# Patient Record
Sex: FEMALE | Race: WHITE | NOT HISPANIC OR LATINO | Employment: OTHER | URBAN - METROPOLITAN AREA
[De-identification: names, ages, dates, MRNs, and addresses within clinical notes are randomized per-mention and may not be internally consistent; named-entity substitution may affect disease eponyms.]

---

## 2018-08-25 ENCOUNTER — HOSPITAL ENCOUNTER (EMERGENCY)
Facility: HOSPITAL | Age: 53
Discharge: HOME | End: 2018-08-25
Attending: EMERGENCY MEDICINE
Payer: MEDICARE

## 2018-08-25 VITALS
OXYGEN SATURATION: 99 % | WEIGHT: 120 LBS | DIASTOLIC BLOOD PRESSURE: 65 MMHG | BODY MASS INDEX: 23.44 KG/M2 | SYSTOLIC BLOOD PRESSURE: 140 MMHG | RESPIRATION RATE: 20 BRPM | TEMPERATURE: 98.6 F | HEART RATE: 86 BPM

## 2018-08-25 DIAGNOSIS — F14.10 COCAINE ABUSE (HCC): Primary | ICD-10-CM

## 2018-08-25 LAB
ALBUMIN SERPL BCP-MCNC: 3.4 G/DL (ref 3.5–5)
ALP SERPL-CCNC: 52 U/L (ref 46–116)
ALT SERPL W P-5'-P-CCNC: 29 U/L (ref 12–78)
AMPHETAMINES SERPL QL SCN: NEGATIVE
ANION GAP SERPL CALCULATED.3IONS-SCNC: 5 MMOL/L (ref 4–13)
AST SERPL W P-5'-P-CCNC: 19 U/L (ref 5–45)
BARBITURATES UR QL: NEGATIVE
BASOPHILS # BLD AUTO: 0.02 THOUSANDS/ΜL (ref 0–0.1)
BASOPHILS NFR BLD AUTO: 0 % (ref 0–1)
BENZODIAZ UR QL: NEGATIVE
BILIRUB SERPL-MCNC: 0.3 MG/DL (ref 0.2–1)
BUN SERPL-MCNC: 5 MG/DL (ref 5–25)
CALCIUM SERPL-MCNC: 8.9 MG/DL (ref 8.3–10.1)
CHLORIDE SERPL-SCNC: 108 MMOL/L (ref 100–108)
CO2 SERPL-SCNC: 31 MMOL/L (ref 21–32)
COCAINE UR QL: POSITIVE
CREAT SERPL-MCNC: 0.78 MG/DL (ref 0.6–1.3)
EOSINOPHIL # BLD AUTO: 0.06 THOUSAND/ΜL (ref 0–0.61)
EOSINOPHIL NFR BLD AUTO: 1 % (ref 0–6)
ERYTHROCYTE [DISTWIDTH] IN BLOOD BY AUTOMATED COUNT: 12.5 % (ref 11.6–15.1)
GFR SERPL CREATININE-BSD FRML MDRD: 88 ML/MIN/1.73SQ M
GLUCOSE SERPL-MCNC: 111 MG/DL (ref 65–140)
HCT VFR BLD AUTO: 41 % (ref 34.8–46.1)
HGB BLD-MCNC: 13.5 G/DL (ref 11.5–15.4)
IMM GRANULOCYTES # BLD AUTO: 0.02 THOUSAND/UL (ref 0–0.2)
IMM GRANULOCYTES NFR BLD AUTO: 0 % (ref 0–2)
LYMPHOCYTES # BLD AUTO: 1.73 THOUSANDS/ΜL (ref 0.6–4.47)
LYMPHOCYTES NFR BLD AUTO: 19 % (ref 14–44)
MCH RBC QN AUTO: 31.9 PG (ref 26.8–34.3)
MCHC RBC AUTO-ENTMCNC: 32.9 G/DL (ref 31.4–37.4)
MCV RBC AUTO: 97 FL (ref 82–98)
METHADONE UR QL: NEGATIVE
MONOCYTES # BLD AUTO: 0.65 THOUSAND/ΜL (ref 0.17–1.22)
MONOCYTES NFR BLD AUTO: 7 % (ref 4–12)
NEUTROPHILS # BLD AUTO: 6.46 THOUSANDS/ΜL (ref 1.85–7.62)
NEUTS SEG NFR BLD AUTO: 73 % (ref 43–75)
NRBC BLD AUTO-RTO: 0 /100 WBCS
OPIATES UR QL SCN: NEGATIVE
PCP UR QL: NEGATIVE
PLATELET # BLD AUTO: 332 THOUSANDS/UL (ref 149–390)
PMV BLD AUTO: 10.3 FL (ref 8.9–12.7)
POTASSIUM SERPL-SCNC: 3.6 MMOL/L (ref 3.5–5.3)
PROT SERPL-MCNC: 7.1 G/DL (ref 6.4–8.2)
RBC # BLD AUTO: 4.23 MILLION/UL (ref 3.81–5.12)
SODIUM SERPL-SCNC: 144 MMOL/L (ref 136–145)
THC UR QL: NEGATIVE
WBC # BLD AUTO: 8.94 THOUSAND/UL (ref 4.31–10.16)

## 2018-08-25 PROCEDURE — 96375 TX/PRO/DX INJ NEW DRUG ADDON: CPT

## 2018-08-25 PROCEDURE — 85025 COMPLETE CBC W/AUTO DIFF WBC: CPT | Performed by: EMERGENCY MEDICINE

## 2018-08-25 PROCEDURE — 99284 EMERGENCY DEPT VISIT MOD MDM: CPT

## 2018-08-25 PROCEDURE — 80053 COMPREHEN METABOLIC PANEL: CPT | Performed by: EMERGENCY MEDICINE

## 2018-08-25 PROCEDURE — 36415 COLL VENOUS BLD VENIPUNCTURE: CPT | Performed by: EMERGENCY MEDICINE

## 2018-08-25 PROCEDURE — 96374 THER/PROPH/DIAG INJ IV PUSH: CPT

## 2018-08-25 PROCEDURE — 80307 DRUG TEST PRSMV CHEM ANLYZR: CPT | Performed by: EMERGENCY MEDICINE

## 2018-08-25 PROCEDURE — 96361 HYDRATE IV INFUSION ADD-ON: CPT

## 2018-08-25 RX ORDER — LORAZEPAM 2 MG/ML
1 INJECTION INTRAMUSCULAR ONCE
Status: COMPLETED | OUTPATIENT
Start: 2018-08-25 | End: 2018-08-25

## 2018-08-25 RX ORDER — DIPHENHYDRAMINE HYDROCHLORIDE 50 MG/ML
25 INJECTION INTRAMUSCULAR; INTRAVENOUS ONCE
Status: COMPLETED | OUTPATIENT
Start: 2018-08-25 | End: 2018-08-25

## 2018-08-25 RX ORDER — OXYCODONE HYDROCHLORIDE AND ACETAMINOPHEN 5; 325 MG/1; MG/1
1 TABLET ORAL 3 TIMES DAILY
Status: ON HOLD | COMMUNITY
End: 2022-01-01 | Stop reason: CLARIF

## 2018-08-25 RX ADMIN — DIPHENHYDRAMINE HYDROCHLORIDE 25 MG: 50 INJECTION, SOLUTION INTRAMUSCULAR; INTRAVENOUS at 08:02

## 2018-08-25 RX ADMIN — SODIUM CHLORIDE 1000 ML: 0.9 INJECTION, SOLUTION INTRAVENOUS at 08:05

## 2018-08-25 RX ADMIN — LORAZEPAM 1 MG: 2 INJECTION INTRAMUSCULAR; INTRAVENOUS at 08:41

## 2018-08-25 NOTE — DISCHARGE INSTRUCTIONS
Cocaine Abuse   WHAT YOU NEED TO KNOW:   Cocaine abuse is a pattern of use that causes health or other problems  Abuse can include using large amounts of cocaine at one time or using it several times each day or week  You may start needing more cocaine to get the same feelings of happiness you got from lower amounts  You may have withdrawal symptoms if you have used cocaine for a long time and you suddenly use less or stop using it  DISCHARGE INSTRUCTIONS:   Seek care immediately if:   · You feel like hurting yourself or someone else  · You have a seizure  · You have a temperature over 101°F (38 3°C) after you use cocaine  · You cough or spit up blood  · You have severe abdominal pain  · You have a severe headache, confusion, or feel very nervous  · You have weakness on one side of your body  · You have chest pain, sweating, or shortness of breath  Contact your healthcare provider if:   · You feel you cannot cope with your problems  · You have questions or concerns about your condition or care  Signs of cocaine abuse:  Cocaine abuse may lead to problems being around others, doing your job, or new medical problems  You may have the following problems:  · Use of more cocaine than you first wanted to use     · No ability to decrease or control your use of cocaine    · Spending much of your time using cocaine, or dealing with a hangover after you use cocaine    · Less time spent around others, at work, or doing activities that you enjoy    · Continued cocaine use, even when it causes physical or mental problems  How cocaine may affect your baby:   · Cocaine may harm your unborn baby's brain, heart, stomach, and bowels  It also increases your risk of a miscarriage, early delivery, or stillbirth  Cocaine can cause long-term medical problems for your baby  · Your baby may go through withdrawal after he is born  He may have seizures, problems waking, or feeding   He may overreact to sounds or movement by violently jerking or jumping  He may vomit or have diarrhea  He may have learning difficulties or other behavior problems when he gets older  Signs and symptoms of cocaine withdrawal:  Withdrawal happens when you have used cocaine for a long period of time, and you suddenly take less or stop taking it  Symptoms may begin within a few hours after you decrease or stop taking cocaine and may include the following:  · Severe sadness or fatigue    · Restlessness, nervousness, or anxiety    · Nausea or vomiting    · Trouble sleeping or difficulty waking up    · Unpleasant dreams that seem real    · Seeing, hearing, or feeling things that are not really there     · Sweating, shaking, or a fast heartbeat    · Seizure  Follow up with your healthcare provider as directed:  Write down your questions so you remember to ask them during your visits  For support and more information:   · Substance Abuse and [address] , [address] [place]  Web Address: https://[employer]/  · [facility] on Drug Abuse  [address] [place] [id number]  Phone: 9- 929 - 885-4196  Web Address: www lyle nih gov  © 2017 [address] [place] [address] Street is for End User's use only and may not be sold, redistributed or otherwise used for commercial purposes  All illustrations and images included in CareNotes® are the copyrighted property of A D A M , Inc  or [employer]  The above information is an  only  It is not intended as medical advice for individual conditions or treatments  Talk to your doctor, nurse or pharmacist before following any medical regimen to see if it is safe and effective for you

## 2018-08-25 NOTE — ED NOTES
Pt pulled out IV and attempted to enter another patients room  Disoriented and unsteady  Pt returned to room  Bleeding from IV site stopped and pt washed up  Changed into hospital [facility]  Bedding changed and pt returned to bed         Timothy Park RN  08/25/18 5514

## 2018-08-25 NOTE — ED PROVIDER NOTES
History  Chief Complaint   Patient presents with    Medication Problem     Pt reports taking percocet and 3 unisom last night to help sleep  Pt woke up this morning twitching, spastic movements  Patient is a 62-year-old female brought in by squad for reported full body twitching and unable to sit still  Patient states she took her normal Percocet last night associated with the borrow trazodone and 3 Unisom  Patient denies any other illegal substances  Patient denies any headache, no visual changes, no chest pain, no abdominal pain, no nausea vomiting or diarrhea  Patient states she has never had any issues like this in the past, patient has never taken use some or trazodone in the past             Prior to Admission Medications   Prescriptions Last Dose Informant Patient Reported? Taking? Doxylamine Succinate, Sleep, (UNISOM PO)   Yes Yes   Sig: Take by mouth   Multiple Vitamin (MULTIVITAMIN) capsule   Yes No   Sig: Take 1 capsule by mouth daily  oxyCODONE-acetaminophen (PERCOCET) 5-325 mg per tablet   Yes Yes   Sig: Take 1 tablet by mouth 3 (three) times a day      Facility-Administered Medications: None       Past Medical History:   Diagnosis Date    Chronic pain     neck       Past Surgical History:   Procedure Laterality Date    CERVICAL FUSION      NECK SURGERY         No family history on file  I have reviewed and agree with the history as documented  Social History   Substance Use Topics    Smoking status: Current Every Day Smoker     Packs/day: 0 20    Smokeless tobacco: Not on file    Alcohol use No        Review of Systems   Constitutional: Negative for chills and fever  HENT: Negative for facial swelling and trouble swallowing  Respiratory: Negative for chest tightness and shortness of breath  Cardiovascular: Negative for chest pain  Gastrointestinal: Negative for abdominal pain, nausea and vomiting  Genitourinary: Negative for dysuria and flank pain  Musculoskeletal: Positive for neck pain  Negative for back pain  Skin: Negative  Neurological: Positive for tremors  Negative for weakness, numbness and headaches  Hematological: Negative  Psychiatric/Behavioral: Negative  Physical Exam  Physical Exam   Constitutional: She is oriented to person, place, and time  She appears well-developed and well-nourished  She appears distressed  HENT:   Head: Normocephalic and atraumatic  Eyes: EOM are normal  Pupils are equal, round, and reactive to light  Neck: Normal range of motion  Cardiovascular: Normal rate and regular rhythm  Pulmonary/Chest: Effort normal and breath sounds normal    Abdominal: Soft  Bowel sounds are normal    Musculoskeletal: Normal range of motion  Neurological: She is alert and oriented to person, place, and time  No cranial nerve deficit  Patient is basically fluttering around on the bed in no discernible pattern of seizures or tremors   Skin: Skin is warm and dry  Psychiatric: Thought content normal  Her mood appears anxious  Her speech is rapid and/or pressured  She is agitated  Cognition and memory are normal    Nursing note and vitals reviewed        Vital Signs  ED Triage Vitals   Temperature Pulse Respirations Blood Pressure SpO2   08/25/18 0820 08/25/18 0750 08/25/18 0750 08/25/18 0750 08/25/18 0750   98 1 °F (36 7 °C) 94 22 (!) 203/155 98 %      Temp Source Heart Rate Source Patient Position - Orthostatic VS BP Location FiO2 (%)   08/25/18 0820 08/25/18 0750 08/25/18 0750 08/25/18 0750 --   Temporal Monitor Lying Right arm       Pain Score       08/25/18 0750       Worst Possible Pain           Vitals:    08/25/18 0750 08/25/18 1306   BP: (!) 203/155 140/65   Pulse: 94 86   Patient Position - Orthostatic VS: Lying Lying       Visual Acuity      ED Medications  Medications   diphenhydrAMINE (BENADRYL) injection 25 mg (25 mg Intravenous Given 8/25/18 0802)   sodium chloride 0 9 % bolus 1,000 mL (0 mL Intravenous Stopped 8/25/18 1005)   LORazepam (ATIVAN) 2 mg/mL injection 1 mg (1 mg Intravenous Given 8/25/18 0841)       Diagnostic Studies  Results Reviewed     Procedure Component Value Units Date/Time    Comprehensive metabolic panel [[id number]]  (Abnormal) Collected:  08/25/18 1301    Lab Status:  Final result Specimen:  Blood from Arm, Right Updated:  08/25/18 1324     Sodium 144 mmol/L      Potassium 3 6 mmol/L      Chloride 108 mmol/L      CO2 31 mmol/L      Anion Gap 5 mmol/L      BUN 5 mg/dL      Creatinine 0 78 mg/dL      Glucose 111 mg/dL      Calcium 8 9 mg/dL      AST 19 U/L      ALT 29 U/L      Alkaline Phosphatase 52 U/L      Total Protein 7 1 g/dL      Albumin 3 4 (L) g/dL      Total Bilirubin 0 30 mg/dL      eGFR 88 ml/min/1 73sq m     Narrative:         National Kidney Disease Education Program recommendations are as follows:  GFR calculation is accurate only with a steady state creatinine  Chronic Kidney disease less than 60 ml/min/1 73 sq  meters  Kidney failure less than 15 ml/min/1 73 sq  meters      CBC and differential [[id number]] Collected:  08/25/18 1301    Lab Status:  Final result Specimen:  Blood from Arm, Right Updated:  08/25/18 1306     WBC 8 94 Thousand/uL      RBC 4 23 Million/uL      Hemoglobin 13 5 g/dL      Hematocrit 41 0 %      MCV 97 fL      MCH 31 9 pg      MCHC 32 9 g/dL      RDW 12 5 %      MPV 10 3 fL      Platelets 307 Thousands/uL      nRBC 0 /100 WBCs      Neutrophils Relative 73 %      Immat GRANS % 0 %      Lymphocytes Relative 19 %      Monocytes Relative 7 %      Eosinophils Relative 1 %      Basophils Relative 0 %      Neutrophils Absolute 6 46 Thousands/µL      Immature Grans Absolute 0 02 Thousand/uL      Lymphocytes Absolute 1 73 Thousands/µL      Monocytes Absolute 0 65 Thousand/µL      Eosinophils Absolute 0 06 Thousand/µL      Basophils Absolute 0 02 Thousands/µL     Rapid drug screen, urine [[id number]]  (Abnormal) Collected:  08/25/18 0819    Lab Status:  Final result Specimen:  Urine from Urine, Clean Catch Updated:  08/25/18 0840     Amph/Meth UR Negative     Barbiturate Ur Negative     Benzodiazepine Urine Negative     Cocaine Urine Positive (A)     Methadone Urine Negative     Opiate Urine Negative     PCP Ur Negative     THC Urine Negative    Narrative:         Presumptive report  If requested, specimen will be sent to reference lab for confirmation  FOR MEDICAL PURPOSES ONLY  IF CONFIRMATION NEEDED PLEASE CONTACT THE LAB WITHIN 5 DAYS  Drug Screen Cutoff Levels:  AMPHETAMINE/METHAMPHETAMINES  1000 ng/mL  BARBITURATES     200 ng/mL  BENZODIAZEPINES     200 ng/mL  COCAINE      300 ng/mL  METHADONE      300 ng/mL  OPIATES      300 ng/mL  PHENCYCLIDINE     25 ng/mL  THC       50 ng/mL                 No orders to display              Procedures  Procedures       Phone Contacts  ED Phone Contact    ED Course                               MDM  Number of Diagnoses or Management Options  Cocaine abuse:   Diagnosis management comments: At the urine drug screen came back patient admits to using cocaine  Patient's left for many hours after given several doses of Ativan  Patient is stable for discharge home       Amount and/or Complexity of Data Reviewed  Clinical lab tests: ordered and reviewed      CritCare Time    Disposition  Final diagnoses:   Cocaine abuse     Time reflects when diagnosis was documented in both MDM as applicable and the Disposition within this note     Time User Action Codes Description Comment    8/25/2018  4:27 PM Rajendra Angeles Add [F14 10] Cocaine abuse       ED Disposition     ED Disposition Condition Comment    Discharge  Destini Miss discharge to home/self care      Condition at discharge: Stable        Follow-up Information     Follow up With Specialties Details Why Vita Noel MD  Schedule an appointment as soon as possible for a visit in 3 days  [address] [facility] [address] [facility]  [phone]            Discharge Medication List as of 8/25/2018  4:27 PM      CONTINUE these medications which have NOT CHANGED    Details   Doxylamine Succinate, Sleep, (UNISOM PO) Take by mouth, Historical Med      oxyCODONE-acetaminophen (PERCOCET) 5-325 mg per tablet Take 1 tablet by mouth 3 (three) times a day, Historical Med      Multiple Vitamin (MULTIVITAMIN) capsule Take 1 capsule by mouth daily  , Until Discontinued, Historical Med           No discharge procedures on file      ED Provider  Electronically Signed by           Jamilah Still MD  08/26/18 5283

## 2019-03-08 ENCOUNTER — HOSPITAL ENCOUNTER (EMERGENCY)
Facility: HOSPITAL | Age: 54
Discharge: HOME | End: 2019-03-08
Attending: EMERGENCY MEDICINE | Admitting: EMERGENCY MEDICINE
Payer: MEDICARE

## 2019-03-08 ENCOUNTER — APPOINTMENT (EMERGENCY)
Dept: RADIOLOGY | Facility: HOSPITAL | Age: 54
End: 2019-03-08
Payer: MEDICARE

## 2019-03-08 VITALS
HEART RATE: 101 BPM | RESPIRATION RATE: 20 BRPM | TEMPERATURE: 99 F | DIASTOLIC BLOOD PRESSURE: 96 MMHG | OXYGEN SATURATION: 96 % | SYSTOLIC BLOOD PRESSURE: 146 MMHG

## 2019-03-08 DIAGNOSIS — F14.10 COCAINE USE DISORDER (HCC): Primary | ICD-10-CM

## 2019-03-08 LAB
ALBUMIN SERPL BCP-MCNC: 3.7 G/DL (ref 3.5–5)
ALP SERPL-CCNC: 60 U/L (ref 46–116)
ALT SERPL W P-5'-P-CCNC: 24 U/L (ref 12–78)
AMPHETAMINES SERPL QL SCN: POSITIVE
ANION GAP SERPL CALCULATED.3IONS-SCNC: 6 MMOL/L (ref 4–13)
APTT PPP: 32 SECONDS (ref 26–38)
AST SERPL W P-5'-P-CCNC: 25 U/L (ref 5–45)
BACTERIA UR QL AUTO: ABNORMAL /HPF
BARBITURATES UR QL: NEGATIVE
BENZODIAZ UR QL: POSITIVE
BILIRUB SERPL-MCNC: 0.3 MG/DL (ref 0.2–1)
BILIRUB UR QL STRIP: NEGATIVE
BUN SERPL-MCNC: 10 MG/DL (ref 5–25)
CALCIUM SERPL-MCNC: 9.7 MG/DL (ref 8.3–10.1)
CHLORIDE SERPL-SCNC: 100 MMOL/L (ref 100–108)
CLARITY UR: CLEAR
CO2 SERPL-SCNC: 31 MMOL/L (ref 21–32)
COCAINE UR QL: POSITIVE
COLOR UR: YELLOW
CREAT SERPL-MCNC: 0.89 MG/DL (ref 0.6–1.3)
ETHANOL SERPL-MCNC: <3 MG/DL (ref 0–3)
GFR SERPL CREATININE-BSD FRML MDRD: 75 ML/MIN/1.73SQ M
GLUCOSE SERPL-MCNC: 104 MG/DL (ref 65–140)
GLUCOSE UR STRIP-MCNC: NEGATIVE MG/DL
HGB UR QL STRIP.AUTO: ABNORMAL
HYALINE CASTS #/AREA URNS LPF: ABNORMAL /LPF
INR PPP: 1.07 (ref 0.86–1.16)
KETONES UR STRIP-MCNC: NEGATIVE MG/DL
LEUKOCYTE ESTERASE UR QL STRIP: ABNORMAL
METHADONE UR QL: NEGATIVE
NITRITE UR QL STRIP: NEGATIVE
NON-SQ EPI CELLS URNS QL MICRO: ABNORMAL /HPF
OPIATES UR QL SCN: POSITIVE
PCP UR QL: NEGATIVE
PH UR STRIP.AUTO: 7 [PH]
POTASSIUM SERPL-SCNC: 3.7 MMOL/L (ref 3.5–5.3)
PROT SERPL-MCNC: 7.6 G/DL (ref 6.4–8.2)
PROT UR STRIP-MCNC: NEGATIVE MG/DL
PROTHROMBIN TIME: 11.2 SECONDS (ref 9.4–11.7)
RBC #/AREA URNS AUTO: ABNORMAL /HPF
SODIUM SERPL-SCNC: 137 MMOL/L (ref 136–145)
SP GR UR STRIP.AUTO: 1.01 (ref 1–1.03)
THC UR QL: NEGATIVE
UROBILINOGEN UR QL STRIP.AUTO: 0.2 E.U./DL
WBC #/AREA URNS AUTO: ABNORMAL /HPF

## 2019-03-08 PROCEDURE — 85730 THROMBOPLASTIN TIME PARTIAL: CPT | Performed by: EMERGENCY MEDICINE

## 2019-03-08 PROCEDURE — 80053 COMPREHEN METABOLIC PANEL: CPT | Performed by: EMERGENCY MEDICINE

## 2019-03-08 PROCEDURE — 71045 X-RAY EXAM CHEST 1 VIEW: CPT

## 2019-03-08 PROCEDURE — 80307 DRUG TEST PRSMV CHEM ANLYZR: CPT | Performed by: EMERGENCY MEDICINE

## 2019-03-08 PROCEDURE — 81001 URINALYSIS AUTO W/SCOPE: CPT | Performed by: EMERGENCY MEDICINE

## 2019-03-08 PROCEDURE — 93005 ELECTROCARDIOGRAM TRACING: CPT

## 2019-03-08 PROCEDURE — 80320 DRUG SCREEN QUANTALCOHOLS: CPT | Performed by: EMERGENCY MEDICINE

## 2019-03-08 PROCEDURE — 99285 EMERGENCY DEPT VISIT HI MDM: CPT

## 2019-03-08 PROCEDURE — 85610 PROTHROMBIN TIME: CPT | Performed by: EMERGENCY MEDICINE

## 2019-03-08 PROCEDURE — 36415 COLL VENOUS BLD VENIPUNCTURE: CPT | Performed by: EMERGENCY MEDICINE
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
## 2019-03-08 NOTE — ED NOTES
Pt is awake and alert, cooperative to NELSON Olvera, NELSON  03/08/19 Lena Torresi, RN  03/08/19 6289
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
## 2019-03-08 NOTE — ED NOTES
Janet nurse called to discuss pt case  Janet Nurse declined to see the pt at this time, Janet stated the pt's secondary complaint and presentation is inline with needing a psychiatric evaluation   Janet states to reach out to them about the Pt's case if anything changes      Echo Loyola RN  03/08/19 2052

## 2019-03-08 NOTE — ED NOTES
Chandrakant Silver reported the patient initilly just wanted counseling but changed her mind during the call and wanted shelter placement - Chandrakant Silver reported they are at capacity (full) and will do a bed search to another women's shelter - gave them the PES phone number in order to reach the patient from the shelter once it is located  RIVERA explained this to the patient and updated ER staff  Garrett Plascencia called patient in the ER and did a phone intake  Patient was d/c'd from the ER and will drive herself to shelter

## 2019-03-11 LAB
ATRIAL RATE: 103 BPM
P AXIS: 71 DEGREES
PR INTERVAL: 136 MS
QRS AXIS: 84 DEGREES
QRSD INTERVAL: 80 MS
QT INTERVAL: 352 MS
QTC INTERVAL: 461 MS
T WAVE AXIS: 51 DEGREES
VENTRICULAR RATE: 103 BPM

## 2019-03-11 PROCEDURE — 93010 ELECTROCARDIOGRAM REPORT: CPT | Performed by: INTERNAL MEDICINE

## 2019-12-06 ENCOUNTER — HOSPITAL ENCOUNTER (EMERGENCY)
Facility: HOSPITAL | Age: 54
Discharge: HOME | End: 2019-12-06
Attending: EMERGENCY MEDICINE
Payer: MEDICARE

## 2019-12-06 VITALS
SYSTOLIC BLOOD PRESSURE: 146 MMHG | HEART RATE: 74 BPM | OXYGEN SATURATION: 99 % | DIASTOLIC BLOOD PRESSURE: 79 MMHG | HEIGHT: 60 IN | RESPIRATION RATE: 18 BRPM | WEIGHT: 117.73 LBS | TEMPERATURE: 98.8 F | BODY MASS INDEX: 23.11 KG/M2

## 2019-12-06 DIAGNOSIS — K08.89 PAIN, DENTAL: Primary | ICD-10-CM

## 2019-12-06 DIAGNOSIS — K04.7 DENTAL ABSCESS: ICD-10-CM

## 2019-12-06 PROCEDURE — 99282 EMERGENCY DEPT VISIT SF MDM: CPT

## 2019-12-06 RX ORDER — NAPROXEN 500 MG/1
500 TABLET ORAL 2 TIMES DAILY WITH MEALS
Qty: 10 TABLET | Refills: 0 | Status: SHIPPED | OUTPATIENT
Start: 2019-12-06 | End: 2019-12-11

## 2019-12-06 RX ORDER — HYDROCODONE BITARTRATE AND ACETAMINOPHEN 5; 325 MG/1; MG/1
1 TABLET ORAL EVERY 6 HOURS PRN
Qty: 12 TABLET | Refills: 0 | Status: SHIPPED | OUTPATIENT
Start: 2019-12-06 | End: 2019-12-09

## 2019-12-06 RX ORDER — CLINDAMYCIN HYDROCHLORIDE 300 MG/1
300 CAPSULE ORAL EVERY 6 HOURS
Qty: 40 CAPSULE | Refills: 0 | Status: SHIPPED | OUTPATIENT
Start: 2019-12-06 | End: 2019-12-16

## 2019-12-06 NOTE — ED PROVIDER NOTES
History  Chief Complaint   Patient presents with    Dental Pain     c/o pain in lower right tooth since last night, cheek is swollen     47 y/o female presents with right lower tooth pain and swelling, ongoing for 2 days, worse today  No breathing or swallowing difficulty, no fevers,or chills  Has not called a dentist  No nausea,vomiting or any other symptoms  History provided by:  Patient   used: No        Prior to Admission Medications   Prescriptions Last Dose Informant Patient Reported? Taking? Doxylamine Succinate, Sleep, (UNISOM PO) Not Taking at Unknown time  Yes No   Sig: Take by mouth   Multiple Vitamin (MULTIVITAMIN) capsule Past Week at Unknown time  Yes Yes   Sig: Take 1 capsule by mouth daily  oxyCODONE-acetaminophen (PERCOCET) 5-325 mg per tablet Not Taking at Unknown time  Yes No   Sig: Take 1 tablet by mouth 3 (three) times a day      Facility-Administered Medications: None       Past Medical History:   Diagnosis Date    Chronic pain     neck       Past Surgical History:   Procedure Laterality Date    CERVICAL FUSION      NECK SURGERY         History reviewed  No pertinent family history  I have reviewed and agree with the history as documented  Social History     Tobacco Use    Smoking status: Current Every Day Smoker     Packs/day: 0 20    Smokeless tobacco: Never Used   Substance Use Topics    Alcohol use: No    Drug use: No        Review of Systems   All other systems reviewed and are negative  Physical Exam  Physical Exam   Constitutional: She is oriented to person, place, and time  She appears well-developed and well-nourished  HENT:   Head: Normocephalic and atraumatic     Right lower pre molar:tender to palpation, with mild erythema noted with no fluctuant abscess, no erythema noted around the jaw line, no evidence of facial cellulitis, no evidence of mariluz's angina, no submandibular edema, or erythema   Eyes: Pupils are equal, round, and reactive to light  EOM are normal    Neck: Normal range of motion  Neck supple  Cardiovascular: Normal rate and regular rhythm  Pulmonary/Chest: Effort normal and breath sounds normal    Abdominal: Soft  Bowel sounds are normal    Musculoskeletal: Normal range of motion  Neurological: She is alert and oriented to person, place, and time  Skin: Skin is warm and dry  Psychiatric: She has a normal mood and affect  Nursing note and vitals reviewed  Vital Signs  ED Triage Vitals [12/06/19 1125]   Temperature Pulse Respirations Blood Pressure SpO2   98 8 °F (37 1 °C) 74 18 146/79 99 %      Temp Source Heart Rate Source Patient Position - Orthostatic VS BP Location FiO2 (%)   Tympanic Monitor Sitting Right arm --      Pain Score       Worst Possible Pain           Vitals:    12/06/19 1125   BP: 146/79   Pulse: 74   Patient Position - Orthostatic VS: Sitting         Visual Acuity      ED Medications  Medications - No data to display    Diagnostic Studies  Results Reviewed     None                 No orders to display              Procedures  Procedures         ED Course                               MDM  Number of Diagnoses or Management Options  Patient Progress  Patient progress: stable        Disposition  Final diagnoses:   Pain, dental   Dental abscess     Time reflects when diagnosis was documented in both MDM as applicable and the Disposition within this note     Time User Action Codes Description Comment    12/6/2019 11:36 AM Archana Chowdhury Add [K08 89] Pain, dental     12/6/2019 11:36 AM Kate Chowdhury Add [K04 7] Dental abscess       ED Disposition     ED Disposition Condition Date/Time Comment    Discharge Stable Fri Dec 6, 2019 11:36 AM Garnell Fee discharge to home/self care              Follow-up Information     Follow up With Specialties Details Why Contact Info Additional Edward Galvan MD  Schedule an appointment as soon as possible for a visit   [address] [facility] Emergency Department Emergency Medicine  If symptoms worsen [address] [address]  [phone] [place] [facility], [place], [place], [address] Oral Surgery   [address] [facility]  [phone]             Patient's Medications   Discharge Prescriptions    CLINDAMYCIN (CLEOCIN) 300 MG CAPSULE    Take 1 capsule (300 mg total) by mouth every 6 (six) hours for 10 days       Start Date: 12/6/2019 End Date: 12/16/2019       Order Dose: 300 mg       Quantity: 40 capsule    Refills: 0    HYDROCODONE-ACETAMINOPHEN (NORCO) 5-325 MG PER TABLET    Take 1 tablet by mouth every 6 (six) hours as needed for pain for up to 3 daysMax Daily Amount: 4 tablets       Start Date: 12/6/2019 End Date: 12/9/2019       Order Dose: 1 tablet       Quantity: 12 tablet    Refills: 0    NAPROXEN (NAPROSYN) 500 MG TABLET    Take 1 tablet (500 mg total) by mouth 2 (two) times a day with meals for 5 days       Start Date: 12/6/2019 End Date: 12/11/2019       Order Dose: 500 mg       Quantity: 10 tablet    Refills: 0     No discharge procedures on file      ED Provider  Electronically Signed by           Raquel Kulkarni DO  12/06/19 1139

## 2019-12-06 NOTE — DISCHARGE INSTRUCTIONS
Please return for worsening pain, swelling, fevers trouble or difficulty swallowing or breathing to the ED  Please follow up with the oral surgeon

## 2019-12-15 ENCOUNTER — HOSPITAL ENCOUNTER (EMERGENCY)
Facility: HOSPITAL | Age: 54
Discharge: HOME | End: 2019-12-15
Attending: EMERGENCY MEDICINE
Payer: MEDICARE

## 2019-12-15 VITALS
RESPIRATION RATE: 24 BRPM | DIASTOLIC BLOOD PRESSURE: 64 MMHG | HEART RATE: 102 BPM | TEMPERATURE: 96 F | SYSTOLIC BLOOD PRESSURE: 135 MMHG | OXYGEN SATURATION: 97 %

## 2019-12-15 DIAGNOSIS — K02.9 PAIN DUE TO DENTAL CARIES: Primary | ICD-10-CM

## 2019-12-15 PROCEDURE — 99283 EMERGENCY DEPT VISIT LOW MDM: CPT

## 2019-12-15 RX ORDER — PENICILLIN V POTASSIUM 500 MG/1
500 TABLET ORAL 4 TIMES DAILY
Qty: 40 TABLET | Refills: 0 | Status: SHIPPED | OUTPATIENT
Start: 2019-12-15 | End: 2019-12-22

## 2019-12-15 RX ORDER — PENICILLIN V POTASSIUM 250 MG/1
500 TABLET ORAL ONCE
Status: COMPLETED | OUTPATIENT
Start: 2019-12-15 | End: 2019-12-15

## 2019-12-15 RX ORDER — IBUPROFEN 400 MG/1
400 TABLET ORAL ONCE
Status: COMPLETED | OUTPATIENT
Start: 2019-12-15 | End: 2019-12-15

## 2019-12-15 RX ADMIN — PENICILLIN V POTASSIUM 500 MG: 250 TABLET, FILM COATED ORAL at 22:46

## 2019-12-15 RX ADMIN — IBUPROFEN 400 MG: 400 TABLET ORAL at 22:46

## 2019-12-16 NOTE — ED PROVIDER NOTES
History  Chief Complaint   Patient presents with    Dental Pain     c/o dental pain, seen here on 3rd for same  states only took one and a half days worth of antibiotic because it gave her severe diarrhea and dentist wont pull her tooth until she finnishes antibiotics     Pt in ER with c/o persistent dental pain  She was in the ER earlier in the month with the same complaint, started on a course of clinda and given pain meds  Pt states that she developed diarrhea after a few days of taking Clindamycin, and stopped it  Pt now states that her dentist will not see her until she finishes a course of antibiotics  She denies fevers/chills  History provided by:  Patient   used: No    Dental Pain   Location:  Lower  Lower teeth location:  27/RL cuspid, 28/RL 1st bicuspid and 29/RL 2nd bicuspid  Quality:  Aching  Severity:  Mild  Onset quality:  Sudden  Timing:  Constant  Progression:  Worsening  Chronicity:  Recurrent  Context: poor dentition    Relieved by:  None tried  Associated symptoms: no facial swelling, no fever and no neck pain        Prior to Admission Medications   Prescriptions Last Dose Informant Patient Reported? Taking? Doxylamine Succinate, Sleep, (UNISOM PO) Not Taking at Unknown time  Yes No   Sig: Take by mouth   Multiple Vitamin (MULTIVITAMIN) capsule 12/15/2019 at Unknown time  Yes Yes   Sig: Take 1 capsule by mouth daily     clindamycin (CLEOCIN) 300 MG capsule Past Week at Unknown time  No Yes   Sig: Take 1 capsule (300 mg total) by mouth every 6 (six) hours for 10 days   naproxen (NAPROSYN) 500 mg tablet   No No   Sig: Take 1 tablet (500 mg total) by mouth 2 (two) times a day with meals for 5 days   oxyCODONE-acetaminophen (PERCOCET) 5-325 mg per tablet Not Taking at Unknown time  Yes No   Sig: Take 1 tablet by mouth 3 (three) times a day      Facility-Administered Medications: None       Past Medical History:   Diagnosis Date    Chronic pain     neck       Past Surgical History:   Procedure Laterality Date    CERVICAL FUSION      NECK SURGERY         History reviewed  No pertinent family history  I have reviewed and agree with the history as documented  Social History     Tobacco Use    Smoking status: Current Every Day Smoker     Packs/day: 0 20    Smokeless tobacco: Never Used   Substance Use Topics    Alcohol use: No    Drug use: No        Review of Systems   Constitutional: Negative for chills and fever  HENT: Positive for dental problem  Negative for facial swelling and trouble swallowing  Respiratory: Negative for cough, chest tightness and shortness of breath  Gastrointestinal: Negative for abdominal pain, diarrhea, nausea and vomiting  Genitourinary: Negative for dysuria, frequency, hematuria and urgency  Musculoskeletal: Negative for back pain, neck pain and neck stiffness  All other systems reviewed and are negative  Physical Exam  Physical Exam   Constitutional: She is oriented to person, place, and time  She appears well-developed and well-nourished  No distress  HENT:   Head: Normocephalic and atraumatic  Mouth/Throat: She has dentures  Abnormal dentition  Dental caries present  No dental abscesses  Eyes: Pupils are equal, round, and reactive to light  Conjunctivae and EOM are normal    Neurological: She is alert and oriented to person, place, and time  Nursing note and vitals reviewed        Vital Signs  ED Triage Vitals [12/15/19 2200]   Temperature Pulse Respirations Blood Pressure SpO2   (!) 96 °F (35 6 °C) 102 (!) 24 135/64 97 %      Temp Source Heart Rate Source Patient Position - Orthostatic VS BP Location FiO2 (%)   Tympanic Monitor Sitting Right arm --      Pain Score       Worst Possible Pain           Vitals:    12/15/19 2200   BP: 135/64   Pulse: 102   Patient Position - Orthostatic VS: Sitting         Visual Acuity      ED Medications  Medications   penicillin V potassium (VEETID) tablet 500 mg (500 mg Oral Given 12/15/19 2246)   ibuprofen (MOTRIN) tablet 400 mg (400 mg Oral Given 12/15/19 2246)       Diagnostic Studies  Results Reviewed     None                 No orders to display              Procedures  Procedures         ED Course                               MDM  Number of Diagnoses or Management Options  Pain due to dental caries:   Diagnosis management comments: Will start pt on a course of Penicillin and give outpt f/u with OMFS    Risk of Complications, Morbidity, and/or Mortality  Presenting problems: low  Diagnostic procedures: low  Management options: low    Patient Progress  Patient progress: stable        Disposition  Final diagnoses:   Pain due to dental caries     Time reflects when diagnosis was documented in both MDM as applicable and the Disposition within this note     Time User Action Codes Description Comment    12/15/2019 10:36 PM Mort Daub Add [K02 9] Pain due to dental caries       ED Disposition     ED Disposition Condition Date/Time Comment    Discharge Stable Sun Dec 15, 2019 10:36 PM Ender Olmos discharge to home/self care              Follow-up Information     Follow up With Specialties Details Why Contact Info    Madi Saldana MD  Schedule an appointment as soon as possible for a visit  for follow up [address] [facility] [address] [facility]  [address] for Oral and Maxillofacial Surgery Miguel Angel Jay  Schedule an appointment as soon as possible for a visit [place] 1 [place] [address] [facility]          Discharge Medication List as of 12/15/2019 10:40 PM      START taking these medications    Details   penicillin V potassium (VEETID) 500 mg tablet Take 1 tablet (500 mg total) by mouth 4 (four) times a day for 7 days, Starting Sun 12/15/2019, Until Sun 12/22/2019, Print         CONTINUE these medications which have NOT CHANGED    Details   clindamycin (CLEOCIN) 300 MG capsule Take 1 capsule (300 mg total) by mouth every 6 (six) hours for 10 days, Starting Fri 12/6/2019, Until Mon 12/16/2019, Print      Multiple Vitamin (MULTIVITAMIN) capsule Take 1 capsule by mouth daily  , Until Discontinued, Historical Med      Doxylamine Succinate, Sleep, (UNISOM PO) Take by mouth, Historical Med      naproxen (NAPROSYN) 500 mg tablet Take 1 tablet (500 mg total) by mouth 2 (two) times a day with meals for 5 days, Starting Fri 12/6/2019, Until Wed 12/11/2019, Print      oxyCODONE-acetaminophen (PERCOCET) 5-325 mg per tablet Take 1 tablet by mouth 3 (three) times a day, Historical Med           No discharge procedures on file      ED Provider  Electronically Signed by           Marcus De Leon DO  12/19/19 2613

## 2021-12-25 ENCOUNTER — HOSPITAL ENCOUNTER (EMERGENCY)
Facility: HOSPITAL | Age: 56
Discharge: ANOTHER HOSPITAL | End: 2021-12-27
Attending: EMERGENCY MEDICINE | Admitting: EMERGENCY MEDICINE
Payer: MEDICARE

## 2021-12-25 ENCOUNTER — APPOINTMENT (EMERGENCY)
Dept: CT IMAGING | Facility: HOSPITAL | Age: 56
End: 2021-12-25
Payer: MEDICARE

## 2021-12-25 DIAGNOSIS — L02.01 FACIAL ABSCESS: Primary | ICD-10-CM

## 2021-12-25 DIAGNOSIS — U07.1 COVID-19: ICD-10-CM

## 2021-12-25 DIAGNOSIS — J32.0 RIGHT MAXILLARY SINUSITIS: ICD-10-CM

## 2021-12-25 LAB
ALBUMIN SERPL BCP-MCNC: 4.1 G/DL (ref 3.4–4.8)
ALP SERPL-CCNC: 46.2 U/L (ref 35–140)
ALT SERPL W P-5'-P-CCNC: 13 U/L (ref 5–54)
ANION GAP SERPL CALCULATED.3IONS-SCNC: 9 MMOL/L (ref 4–13)
AST SERPL W P-5'-P-CCNC: 24 U/L (ref 15–41)
BASOPHILS # BLD AUTO: 0.03 THOUSANDS/ΜL (ref 0–0.1)
BASOPHILS NFR BLD AUTO: 0 % (ref 0–1)
BILIRUB SERPL-MCNC: 0.4 MG/DL (ref 0.3–1.2)
BUN SERPL-MCNC: 20 MG/DL (ref 6–20)
CALCIUM SERPL-MCNC: 9 MG/DL (ref 8.4–10.2)
CHLORIDE SERPL-SCNC: 94 MMOL/L (ref 96–108)
CO2 SERPL-SCNC: 29 MMOL/L (ref 22–33)
CREAT SERPL-MCNC: 1.13 MG/DL (ref 0.4–1.1)
EOSINOPHIL # BLD AUTO: 0 THOUSAND/ΜL (ref 0–0.61)
EOSINOPHIL NFR BLD AUTO: 0 % (ref 0–6)
ERYTHROCYTE [DISTWIDTH] IN BLOOD BY AUTOMATED COUNT: 12.7 % (ref 11.6–15.1)
FLUAV RNA RESP QL NAA+PROBE: NEGATIVE
FLUBV RNA RESP QL NAA+PROBE: NEGATIVE
GFR SERPL CREATININE-BSD FRML MDRD: 55 ML/MIN/1.73SQ M
GLUCOSE SERPL-MCNC: 100 MG/DL (ref 65–140)
HCT VFR BLD AUTO: 38.6 % (ref 34.8–46.1)
HGB BLD-MCNC: 13.2 G/DL (ref 11.5–15.4)
IMM GRANULOCYTES # BLD AUTO: 0.04 THOUSAND/UL (ref 0–0.2)
IMM GRANULOCYTES NFR BLD AUTO: 0 % (ref 0–2)
LACTATE SERPL-SCNC: 1 MMOL/L (ref 0–2)
LYMPHOCYTES # BLD AUTO: 1.19 THOUSANDS/ΜL (ref 0.6–4.47)
LYMPHOCYTES NFR BLD AUTO: 13 % (ref 14–44)
MCH RBC QN AUTO: 30.6 PG (ref 26.8–34.3)
MCHC RBC AUTO-ENTMCNC: 34.2 G/DL (ref 31.4–37.4)
MCV RBC AUTO: 90 FL (ref 82–98)
MONOCYTES # BLD AUTO: 1.29 THOUSAND/ΜL (ref 0.17–1.22)
MONOCYTES NFR BLD AUTO: 14 % (ref 4–12)
NEUTROPHILS # BLD AUTO: 6.66 THOUSANDS/ΜL (ref 1.85–7.62)
NEUTS SEG NFR BLD AUTO: 73 % (ref 43–75)
NRBC BLD AUTO-RTO: 0 /100 WBCS
PLATELET # BLD AUTO: 248 THOUSANDS/UL (ref 149–390)
PMV BLD AUTO: 10.2 FL (ref 8.9–12.7)
POTASSIUM SERPL-SCNC: 3.7 MMOL/L (ref 3.5–5)
PROT SERPL-MCNC: 7.3 G/DL (ref 6.4–8.3)
RBC # BLD AUTO: 4.31 MILLION/UL (ref 3.81–5.12)
RSV RNA RESP QL NAA+PROBE: NEGATIVE
SARS-COV-2 RNA RESP QL NAA+PROBE: POSITIVE
SODIUM SERPL-SCNC: 132 MMOL/L (ref 133–145)
WBC # BLD AUTO: 9.21 THOUSAND/UL (ref 4.31–10.16)

## 2021-12-25 PROCEDURE — 83605 ASSAY OF LACTIC ACID: CPT | Performed by: PHYSICIAN ASSISTANT

## 2021-12-25 PROCEDURE — 96375 TX/PRO/DX INJ NEW DRUG ADDON: CPT

## 2021-12-25 PROCEDURE — 85025 COMPLETE CBC W/AUTO DIFF WBC: CPT | Performed by: PHYSICIAN ASSISTANT

## 2021-12-25 PROCEDURE — 99285 EMERGENCY DEPT VISIT HI MDM: CPT | Performed by: PHYSICIAN ASSISTANT

## 2021-12-25 PROCEDURE — G1004 CDSM NDSC: HCPCS

## 2021-12-25 PROCEDURE — 70491 CT SOFT TISSUE NECK W/DYE: CPT

## 2021-12-25 PROCEDURE — 36415 COLL VENOUS BLD VENIPUNCTURE: CPT | Performed by: PHYSICIAN ASSISTANT

## 2021-12-25 PROCEDURE — 80053 COMPREHEN METABOLIC PANEL: CPT | Performed by: PHYSICIAN ASSISTANT

## 2021-12-25 PROCEDURE — 96365 THER/PROPH/DIAG IV INF INIT: CPT

## 2021-12-25 PROCEDURE — 0241U HB NFCT DS VIR RESP RNA 4 TRGT: CPT | Performed by: PHYSICIAN ASSISTANT

## 2021-12-25 PROCEDURE — 99285 EMERGENCY DEPT VISIT HI MDM: CPT

## 2021-12-25 RX ORDER — OXYCODONE HYDROCHLORIDE 5 MG/1
5 TABLET ORAL ONCE
Status: COMPLETED | OUTPATIENT
Start: 2021-12-25 | End: 2021-12-25

## 2021-12-25 RX ORDER — MORPHINE SULFATE 4 MG/ML
4 INJECTION, SOLUTION INTRAMUSCULAR; INTRAVENOUS ONCE
Status: COMPLETED | OUTPATIENT
Start: 2021-12-26 | End: 2021-12-26

## 2021-12-25 RX ORDER — METHYLPREDNISOLONE SODIUM SUCCINATE 125 MG/2ML
125 INJECTION, POWDER, LYOPHILIZED, FOR SOLUTION INTRAMUSCULAR; INTRAVENOUS ONCE
Status: COMPLETED | OUTPATIENT
Start: 2021-12-25 | End: 2021-12-25

## 2021-12-25 RX ORDER — AMOXICILLIN AND CLAVULANATE POTASSIUM 875; 125 MG/1; MG/1
1 TABLET, FILM COATED ORAL ONCE
Status: COMPLETED | OUTPATIENT
Start: 2021-12-25 | End: 2021-12-25

## 2021-12-25 RX ADMIN — IOHEXOL 85 ML: 350 INJECTION, SOLUTION INTRAVENOUS at 16:49

## 2021-12-25 RX ADMIN — OXYCODONE HYDROCHLORIDE 5 MG: 5 TABLET ORAL at 15:50

## 2021-12-25 RX ADMIN — AMOXICILLIN AND CLAVULANATE POTASSIUM 1 TABLET: 875; 125 TABLET, FILM COATED ORAL at 15:50

## 2021-12-25 RX ADMIN — SODIUM CHLORIDE 3 G: 9 INJECTION, SOLUTION INTRAVENOUS at 19:34

## 2021-12-25 RX ADMIN — METHYLPREDNISOLONE SODIUM SUCCINATE 125 MG: 125 INJECTION, POWDER, FOR SOLUTION INTRAMUSCULAR; INTRAVENOUS at 16:38

## 2021-12-26 PROCEDURE — 96366 THER/PROPH/DIAG IV INF ADDON: CPT

## 2021-12-26 PROCEDURE — 96375 TX/PRO/DX INJ NEW DRUG ADDON: CPT

## 2021-12-26 PROCEDURE — 96376 TX/PRO/DX INJ SAME DRUG ADON: CPT

## 2021-12-26 RX ORDER — ACETAMINOPHEN 325 MG/1
975 TABLET ORAL EVERY 6 HOURS PRN
Status: DISCONTINUED | OUTPATIENT
Start: 2021-12-26 | End: 2021-12-27 | Stop reason: HOSPADM

## 2021-12-26 RX ORDER — OXYCODONE HYDROCHLORIDE 5 MG/1
5 TABLET ORAL EVERY 6 HOURS PRN
Status: DISCONTINUED | OUTPATIENT
Start: 2021-12-26 | End: 2021-12-27 | Stop reason: HOSPADM

## 2021-12-26 RX ADMIN — MORPHINE SULFATE 2 MG: 2 INJECTION, SOLUTION INTRAMUSCULAR; INTRAVENOUS at 15:33

## 2021-12-26 RX ADMIN — ACETAMINOPHEN 975 MG: 325 TABLET, FILM COATED ORAL at 20:31

## 2021-12-26 RX ADMIN — MORPHINE SULFATE 4 MG: 4 INJECTION INTRAVENOUS at 00:00

## 2021-12-26 RX ADMIN — OXYCODONE HYDROCHLORIDE 5 MG: 5 TABLET ORAL at 20:31

## 2021-12-26 RX ADMIN — MORPHINE SULFATE 2 MG: 2 INJECTION, SOLUTION INTRAMUSCULAR; INTRAVENOUS at 19:35

## 2021-12-26 RX ADMIN — MORPHINE SULFATE 2 MG: 2 INJECTION, SOLUTION INTRAMUSCULAR; INTRAVENOUS at 09:17

## 2021-12-26 RX ADMIN — AMPICILLIN SODIUM AND SULBACTAM SODIUM 3 G: 2; 1 INJECTION, POWDER, FOR SOLUTION INTRAMUSCULAR; INTRAVENOUS at 08:42

## 2021-12-26 RX ADMIN — AMPICILLIN SODIUM AND SULBACTAM SODIUM 3 G: 2; 1 INJECTION, POWDER, FOR SOLUTION INTRAMUSCULAR; INTRAVENOUS at 15:29

## 2021-12-26 NOTE — ED CARE HANDOFF
Emergency Department Sign Out Note      The patient, Salinas Campbell, was evaluated for facial abscess  Labs Reviewed   COVID19, INFLUENZA A/B, RSV PCR, [facility] - Abnormal       Result Value Ref Range Status    SARS-CoV-2 Positive (*) Negative Final    INFLUENZA A PCR Negative  Negative Final    INFLUENZA B PCR Negative  Negative Final    RSV PCR Negative  Negative Final    Narrative:     FOR PEDIATRIC PATIENTS - copy/paste COVID Guidelines URL to browser: [email or web address]  [employer]     This test has been authorized by FDA under an EUA (Emergency Use Assay) for use by authorized laboratories  Clinical caution and judgement should be used with the interpretation of these results with consideration of the clinical impression and other laboratory testing  Testing reported as "Positive" or "Negative" has been proven to be accurate according to standard laboratory validation requirements  All testing is performed with control materials showing appropriate reactivity at standard intervals     CBC AND DIFFERENTIAL - Abnormal    WBC 9 21  4 31 - 10 16 Thousand/uL Final    RBC 4 31  3 81 - 5 12 Million/uL Final    Hemoglobin 13 2  11 5 - 15 4 g/dL Final    Hematocrit 38 6  34 8 - 46 1 % Final    MCV 90  82 - 98 fL Final    MCH 30 6  26 8 - 34 3 pg Final    MCHC 34 2  31 4 - 37 4 g/dL Final    RDW 12 7  11 6 - 15 1 % Final    MPV 10 2  8 9 - 12 7 fL Final    Platelets 197  795 - 390 Thousands/uL Final    nRBC 0  /100 WBCs Final    Neutrophils Relative 73  43 - 75 % Final    Immat GRANS % 0  0 - 2 % Final    Lymphocytes Relative 13 (*) 14 - 44 % Final    Monocytes Relative 14 (*) 4 - 12 % Final    Eosinophils Relative 0  0 - 6 % Final    Basophils Relative 0  0 - 1 % Final    Neutrophils Absolute 6 66  1 85 - 7 62 Thousands/µL Final    Immature Grans Absolute 0 04  0 00 - 0 20 Thousand/uL Final    Lymphocytes Absolute 1 19  0 60 - 4 47 Thousands/µL Final    Monocytes Absolute 1 29 (*) 0 17 - 1 22 Thousand/µL Final    Eosinophils Absolute 0 00  0 00 - 0 61 Thousand/µL Final    Basophils Absolute 0 03  0 00 - 0 10 Thousands/µL Final   COMPREHENSIVE METABOLIC PANEL - Abnormal    Sodium 132 (*) 133 - 145 mmol/L Final    Potassium 3 7  3 5 - 5 0 mmol/L Final    Chloride 94 (*) 96 - 108 mmol/L Final    CO2 29  22 - 33 mmol/L Final    ANION GAP 9  4 - 13 mmol/L Final    BUN 20  6 - 20 mg/dL Final    Creatinine 1 13 (*) 0 40 - 1 10 mg/dL Final    Comment: Standardized to IDMS reference method    Glucose 100  65 - 140 mg/dL Final    Comment: If the patient is fasting, the ADA then defines impaired fasting glucose as > 100 mg/dL and diabetes as > or equal to 123 mg/dL  Specimen collection should occur prior to Sulfasalazine administration due to the potential for falsely depressed results  Specimen collection should occur prior to Sulfapyridine administration due to the potential for falsely elevated results  Calcium 9 0  8 4 - 10 2 mg/dL Final    AST 24  15 - 41 U/L Final    Comment: Specimen collection should occur prior to Sulfasalazine administration due to the potential for falsely depressed results  ALT 13  5 - 54 U/L Final    Comment: Specimen collection should occur prior to Sulfasalazine administration due to the potential for falsely depressed results       Alkaline Phosphatase 46 2  35 - 140 U/L Final    Total Protein 7 3  6 4 - 8 3 g/dL Final    Albumin 4 1  3 4 - 4 8 g/dL Final    Total Bilirubin 0 40  0 30 - 1 20 mg/dL Final    eGFR 55  ml/min/1 73sq m Final    Narrative:     [place] guidelines for Chronic Kidney Disease (CKD):     Stage 1 with normal or high GFR (GFR > 90 mL/min/1 73 square meters)    Stage 2 Mild CKD (GFR = 60-89 mL/min/1 73 square meters)    Stage 3A Moderate CKD (GFR = 45-59 mL/min/1 73 square meters)    Stage 3B Moderate CKD (GFR = 30-44 mL/min/1 73 square meters)    Stage 4 Severe CKD (GFR = 15-29 mL/min/1 73 square meters)    Stage 5 End Stage CKD (GFR <15 mL/min/1 73 square meters)  Note: GFR calculation is accurate only with a steady state creatinine   LACTIC ACID, PLASMA - Normal    LACTIC ACID 1 0  0 0 - 2 0 mmol/L Final    Narrative:     Result may be elevated if tourniquet was used during collection  Patient is being transferred to [facility](Dr Dani Fong) for further evaluation and treatment  Patient was treated with medication for pain control  Procedures  MDM        Disposition  Final diagnoses:   Facial abscess   Right maxillary sinusitis   COVID-19     Time reflects when diagnosis was documented in both MDM as applicable and the Disposition within this note     Time User Action Codes Description Comment    12/25/2021  8:04 PM Disha Bill Add [L02 01] Facial abscess     12/25/2021  8:04 PM Disha Bill Add [J32 0] Right maxillary sinusitis     12/25/2021  8:04 PM Ramakrishna, [address] [facility] [U07 1] [address] [place] [address]       ED Disposition     ED Disposition Condition Date/Time Comment    Transfer to Another [facility] Dec 25, 2021  8:04 PM Haylee Castañeda should be transferred out to [facility]          MD Documentation      Most Recent Value   Patient Condition The patient has been stabilized such that within reasonable medical probability, no material deterioration of the patient condition or the condition of the unborn child(leah) is likely to result from the transfer   Reason for Transfer Level of Care needed not available at this facility   Benefits of Transfer Specialized equipment and/or services available at the receiving facility (Include comment)________________________  María Kraig and OMFS Evaluation]   Accepting Physician Simran Mccoy    (Name & Tel number) Marv Oswaldo   Transported by [facility] and Unit #) [facility]   Sending MD Dr Morris Gonzalez and Sara Samuels PA-C Provider Certification Unanticipated needs of medical equipment and personnel during transport, Risk of worsening condition, The possibility of a transport vehicle being unavailable, General risk, such as traffic hazards, adverse weather conditions, rough terrain or turbulence, possible failure of equipment (including vehicle or aircraft), or consequences of actions of persons outside the control of the transport personnel      RN Documentation      Most [facility] Name, [facility]    (Name & Tel number) Mortimer Adler   Transported by (Company and Unit #) [employer]      Follow-up Information    None       Patient's Medications   Discharge Prescriptions    No medications on file     No discharge procedures on file         ED Provider  Electronically Signed by     Anthony Kelly MD  12/26/21 [address] [facility] [address], [place]  12/26/21 2808

## 2021-12-26 NOTE — EMTALA/ACUTE CARE TRANSFER
[facility] EMERGENCY DEPARTMENT  [address] [address]  Dept: [phone]      EAAKSO TRANSFER CONSENT    NAME Verenice LEONARDO 1965                              MRN [id number]    I have been informed of my rights regarding examination, treatment, and transfer   by Dr Jasmin Valenzuela MD    Benefits: Specialized equipment and/or services available at the receiving facility (Include comment)________________________ (ENT and OMFS Evaluation)    Risks:        Transfer Request   I acknowledge that my medical condition has been evaluated and explained to me by the emergency department physician or other qualified medical person and/or my attending physician who has recommended and offered to me further medical examination and treatment  I understand the Hospital's obligation with respect to the treatment and stabilization of my emergency medical condition  I nevertheless request to be transferred  I release the Hospital, the doctor, and any other persons caring for me from all responsibility or liability for any injury or ill effects that may result from my transfer and agree to accept all responsibility for the consequences of my choice to transfer, rather than receive stabilizing treatment at the Hospital  I understand that because the transfer is my request, my insurance may not provide reimbursement for the services  The Hospital will assist and direct me and my family in how to make arrangements for transfer, but the hospital is not liable for any fees charged by the transport service  In spite of this understanding, I refuse to consent to further medical examination and treatment which has been offered to me, and request transfer to  [address], [address] : [facility]  I authorize the performance of emergency medical procedures and treatments upon me in both transit and upon arrival at the receiving facility  Additionally, I authorize the release of any and all medical records to the receiving facility and request they be transported with me, if possible  I authorize the performance of emergency medical procedures and treatments upon me in both transit and upon arrival at the receiving facility  Additionally, I authorize the release of any and all medical records to the receiving facility and request they be transported with me, if possible  I understand that the safest mode of transportation during a medical emergency is an ambulance and that the Hospital advocates the use of this mode of transport  Risks of traveling to the receiving facility by car, including absence of medical control, life sustaining equipment, such as oxygen, and medical personnel has been explained to me and I fully understand them  (OSCAR CORRECT BOX BELOW)  [  ]  I consent to the stated transfer and to be transported by ambulance/helicopter  [  ]  I consent to the stated transfer, but refuse transportation by ambulance and accept full responsibility for my transportation by car  I understand the risks of non-ambulance transfers and I exonerate the Hospital and its staff from any deterioration in my condition that results from this refusal     X___________________________________________    DATE  21  TIME________  Signature of patient or legally responsible individual signing on patient behalf           RELATIONSHIP TO PATIENT_________________________          Provider Certification    NAME Angela Sanford                                        [facility] 1965                              MRN [id number]    A medical screening exam was performed on the above named patient  Based on the examination:    Condition Necessitating Transfer The primary encounter diagnosis was Facial abscess  Diagnoses of Right maxillary sinusitis and COVID-19 were also pertinent to this visit      Patient Condition: The patient has been stabilized such that within reasonable medical probability, no material deterioration of the patient condition or the condition of the unborn child(leah) is likely to result from the transfer    Reason for Transfer: Level of Care needed not available at this facility    Transfer Requirements: [address]   · Space available and qualified personnel available for treatment as acknowledged by Alvin Putnam  · Agreed to accept transfer and to provide appropriate medical treatment as acknowledged by       Dr Frank Campbell  · Appropriate medical records of the examination and treatment of the patient are provided at the time of transfer   [address], [address] _______  · Transfer will be performed by qualified personnel from [facility]  and appropriate transfer equipment as required, including the use of necessary and appropriate life support measures  Provider Certification: I have examined the patient and explained the following risks and benefits of being transferred/refusing transfer to the patient/family:  Unanticipated needs of medical equipment and personnel during transport,Risk of worsening condition,The possibility of a transport vehicle being unavailable,General risk, such as traffic hazards, adverse weather conditions, rough terrain or turbulence, possible failure of equipment (including vehicle or aircraft), or consequences of actions of persons outside the control of the transport personnel      Based on these reasonable risks and benefits to the patient and/or the unborn child(leah), and based upon the information available at the time of the patients examination, I certify that the medical benefits reasonably to be expected from the provision of appropriate medical treatments at another medical facility outweigh the increasing risks, if any, to the individuals medical condition, and in the case of labor to the unborn child, from effecting the transfer      X____________________________________________ DATE 12/25/21 TIME_______      ORIGINAL - SEND TO MEDICAL RECORDS   COPY - SEND WITH PATIENT DURING TRANSFER

## 2021-12-26 NOTE — ED PROVIDER NOTES
History  Chief Complaint   Patient presents with    Dental Pain     reports L sided dental swelling since yesterday, Tmax 103, generalized weakness     This is a 71-year-old female with past medical history significant for a cervical fusion presenting to the emergency department today for left-sided facial swelling associated with dental pain  She notes that this pain and swelling began acutely yesterday  She had no swelling prior  She also notes that she had a fever with a T-max of a 103F  She has taken Tylenol 1 hour prior to arrival   Tylenol and ibuprofen are not helping for the patient's pain  She also notes generalized myalgias  She denies any difficulty swallowing or airway obstruction  No trismus, drooling, inability to handle secretions, or hot potato voice  The patient denies any neck pain  No chest pain or shortness of breath  No recent oral procedures  No recent antibiotic therapy  No nausea, vomiting, diarrhea, or constipation  The patient denies other complaints at this time  History provided by:  Patient   used: No    Dental Pain  Toothache location: Left Cheek  Quality:  Throbbing  Severity:  Moderate  Onset quality:  Sudden  Duration:  1 day  Timing:  Constant  Progression:  Worsening  Chronicity:  New  Context: abscess    Relieved by:  Nothing  Worsened by:  Nothing  Ineffective treatments: Tylenol and Ibuprofen  Associated symptoms: facial pain, facial swelling and fever (subjective)    Associated symptoms: no congestion, no difficulty swallowing, no drooling, no headaches, no neck pain, no neck swelling, no oral lesions and no trismus    Risk factors: no diabetes and no immunosuppression        Prior to Admission Medications   Prescriptions Last Dose Informant Patient Reported? Taking? Doxylamine Succinate, Sleep, (UNISOM PO)   Yes No   Sig: Take by mouth   Multiple Vitamin (MULTIVITAMIN) capsule   Yes No   Sig: Take 1 capsule by mouth daily     naproxen (NAPROSYN) 500 mg tablet   No No   Sig: Take 1 tablet (500 mg total) by mouth 2 (two) times a day with meals for 5 days   oxyCODONE-acetaminophen (PERCOCET) 5-325 mg per tablet   Yes No   Sig: Take 1 tablet by mouth 3 (three) times a day      Facility-Administered Medications: None       Past Medical History:   Diagnosis Date    Chronic pain     neck       Past Surgical History:   Procedure Laterality Date    CERVICAL FUSION      NECK SURGERY         History reviewed  No pertinent family history  I have reviewed and agree with the history as documented  E-Cigarette/Vaping     E-Cigarette/Vaping Substances     Social History     Tobacco Use    Smoking status: Current Every Day Smoker     Packs/day: 0 25     Types: Cigarettes    Smokeless tobacco: Never Used   Substance Use Topics    Alcohol use: No    Drug use: No       Review of Systems   Constitutional: Positive for fever (subjective)  Negative for appetite change and chills  HENT: Positive for dental problem and facial swelling  Negative for congestion, drooling, ear discharge, ear pain, mouth sores, rhinorrhea, sinus pressure, sinus pain, sore throat and voice change  - Drooling  - Hot Potato Voice  - Trismus   Eyes: Negative for visual disturbance  Respiratory: Negative for cough, chest tightness, shortness of breath and wheezing  Cardiovascular: Negative for chest pain and palpitations  Gastrointestinal: Negative for abdominal pain, constipation, diarrhea, nausea and vomiting  Genitourinary: Negative for dysuria  Musculoskeletal: Negative for neck pain and neck stiffness  Skin: Negative for rash and wound  Neurological: Negative for dizziness, seizures, syncope, weakness, light-headedness, numbness and headaches  Psychiatric/Behavioral: Negative for confusion  All other systems reviewed and are negative  Physical Exam  Physical Exam  Vitals and nursing note reviewed     Constitutional:       General: She is not in acute distress  Appearance: Normal appearance  She is normal weight  She is not ill-appearing, toxic-appearing or diaphoretic  HENT:      Head: Normocephalic and atraumatic  Right Ear: Tympanic membrane, ear canal and external ear normal  There is no impacted cerumen  Left Ear: Tympanic membrane, ear canal and external ear normal  There is no impacted cerumen  Nose: Nose normal  No congestion or rhinorrhea  Mouth/Throat:      Mouth: Mucous membranes are moist       Pharynx: No oropharyngeal exudate or posterior oropharyngeal erythema  Comments: The patient has a significant amount of left-sided facial swelling that is not obstructing the airway at this time; it feels fluctuant and warm to the touch with overlying cellulitis  Eyes:      General: No scleral icterus  Right eye: No discharge  Left eye: No discharge  Extraocular Movements: Extraocular movements intact  Pupils: Pupils are equal, round, and reactive to light  Neck:      Comments: No extension of cellulitis or signs of infection down into the anterior or posterior neck  Neck is supple with normal flexion and extension  Non meningeal  Cardiovascular:      Rate and Rhythm: Normal rate and regular rhythm  Pulses: Normal pulses  Heart sounds: Normal heart sounds  No murmur heard  No friction rub  No gallop  Comments: 2+ radial pulses bilaterally  Pulmonary:      Effort: Pulmonary effort is normal  No respiratory distress  Breath sounds: Normal breath sounds  No stridor  No wheezing, rhonchi or rales  Comments: Clear to auscultation bilaterally without adventitious breath sounds  Chest:      Chest wall: No tenderness  Abdominal:      General: Abdomen is flat  There is no distension  Palpations: Abdomen is soft  Tenderness: There is no abdominal tenderness  There is no right CVA tenderness, left CVA tenderness, guarding or rebound     Musculoskeletal: General: Normal range of motion  Cervical back: Normal range of motion  No tenderness  Right lower leg: No edema  Left lower leg: No edema  Comments: Negative Levi sign bilaterally   Skin:     General: Skin is warm and dry  Capillary Refill: Capillary refill takes less than 2 seconds  Coloration: Skin is not jaundiced or pale  Neurological:      General: No focal deficit present  Mental Status: She is alert and oriented to person, place, and time  Mental status is at baseline     Psychiatric:         Mood and Affect: Mood normal          Behavior: Behavior normal          Vital Signs  ED Triage Vitals [12/25/21 1523]   Temperature Pulse Respirations Blood Pressure SpO2   98 2 °F (36 8 °C) 94 18 139/99 99 %      Temp Source Heart Rate Source Patient Position - Orthostatic VS BP Location FiO2 (%)   Oral Monitor Sitting Left arm --      Pain Score       10 - Worst Possible Pain           Vitals:    12/26/21 0341 12/26/21 0537 12/26/21 1001 12/26/21 1114   BP: 111/67 108/75 122/69 124/69   Pulse: 75 72 78 84   Patient Position - Orthostatic VS: Lying Lying Lying Lying         Visual Acuity      ED Medications  Medications   ampicillin-sulbactam (UNASYN) 3 g in sodium chloride 0 9 % 100 mL IVPB (3 g Intravenous New Bag 12/26/21 0842)   acetaminophen (TYLENOL) tablet 975 mg (has no administration in time range)   oxyCODONE (ROXICODONE) IR tablet 5 mg (has no administration in time range)   morphine injection 2 mg (2 mg Intravenous Given 12/26/21 0917)   amoxicillin-clavulanate (AUGMENTIN) 875-125 mg per tablet 1 tablet (1 tablet Oral Given 12/25/21 1550)   oxyCODONE (ROXICODONE) IR tablet 5 mg (5 mg Oral Given 12/25/21 1550)   methylPREDNISolone sodium succinate (Solu-MEDROL) injection 125 mg (125 mg Intravenous Given 12/25/21 1638)   iohexol (OMNIPAQUE) 350 MG/ML injection (SINGLE-DOSE) 85 mL (85 mL Intravenous Given 12/25/21 1649)   ampicillin-sulbactam (UNASYN) 3 g in sodium chloride 0 9 % 100 mL IVPB (0 g Intravenous Stopped 12/25/21 2018)   morphine (PF) 4 mg/mL injection 4 mg (4 mg Intravenous Given 12/26/21 0000)       Diagnostic Studies  Results Reviewed     Procedure Component Value Units Date/Time    COVID/FLU/RSV [[id number]]  (Abnormal) Collected: 12/25/21 1605    Lab Status: Final result Specimen: Nares from Nose Updated: 12/25/21 1711     SARS-CoV-2 Positive     INFLUENZA A PCR Negative     INFLUENZA B PCR Negative     RSV PCR Negative    Narrative:      FOR PEDIATRIC PATIENTS - copy/paste COVID Guidelines URL to browser: [email or web address]  [email or web address]     This test has been authorized by FDA under an EUA (Emergency Use Assay) for use by authorized laboratories  Clinical caution and judgement should be used with the interpretation of these results with consideration of the clinical impression and other laboratory testing  Testing reported as "Positive" or "Negative" has been proven to be accurate according to standard laboratory validation requirements  All testing is performed with control materials showing appropriate reactivity at standard intervals      Comprehensive metabolic panel [[id number]]  (Abnormal) Collected: 12/25/21 1546    Lab Status: Final result Specimen: Blood from Arm, Right Updated: 12/25/21 1610     Sodium 132 mmol/L      Potassium 3 7 mmol/L      Chloride 94 mmol/L      CO2 29 mmol/L      ANION GAP 9 mmol/L      BUN 20 mg/dL      Creatinine 1 13 mg/dL      Glucose 100 mg/dL      Calcium 9 0 mg/dL      AST 24 U/L      ALT 13 U/L      Alkaline Phosphatase 46 2 U/L      Total Protein 7 3 g/dL      Albumin 4 1 g/dL      Total Bilirubin 0 40 mg/dL      eGFR 55 ml/min/1 73sq m     Narrative:      [place] guidelines for Chronic Kidney Disease (CKD):     Stage 1 with normal or high GFR (GFR > 90 mL/min/1 73 square meters)    Stage 2 Mild CKD (GFR = 60-89 mL/min/1 73 square meters)    Stage 3A Moderate CKD (GFR = 45-59 mL/min/1 73 square meters)    Stage 3B Moderate CKD (GFR = 30-44 mL/min/1 73 square meters)    Stage 4 Severe CKD (GFR = 15-29 mL/min/1 73 square meters)    Stage 5 End Stage CKD (GFR <15 mL/min/1 73 square meters)  Note: GFR calculation is accurate only with a steady state creatinine    Lactic acid, plasma [[id number]]  (Normal) Collected: 12/25/21 1546    Lab Status: Final result Specimen: Blood from Arm, Right Updated: 12/25/21 1610     LACTIC ACID 1 0 mmol/L     Narrative:      Result may be elevated if tourniquet was used during collection  CBC and differential [[id number]]  (Abnormal) Collected: 12/25/21 1546    Lab Status: Final result Specimen: Blood from Arm, Right Updated: 12/25/21 1553     WBC 9 21 Thousand/uL      RBC 4 31 Million/uL      Hemoglobin 13 2 g/dL      Hematocrit 38 6 %      MCV 90 fL      MCH 30 6 pg      MCHC 34 2 g/dL      RDW 12 7 %      MPV 10 2 fL      Platelets 072 Thousands/uL      nRBC 0 /100 WBCs      Neutrophils Relative 73 %      Immat GRANS % 0 %      Lymphocytes Relative 13 %      Monocytes Relative 14 %      Eosinophils Relative 0 %      Basophils Relative 0 %      Neutrophils Absolute 6 66 Thousands/µL      Immature Grans Absolute 0 04 Thousand/uL      Lymphocytes Absolute 1 19 Thousands/µL      Monocytes Absolute 1 29 Thousand/µL      Eosinophils Absolute 0 00 Thousand/µL      Basophils Absolute 0 03 Thousands/µL                  CT soft tissue neck   Final Result by Arlene Lubin MD (12/25 1800)      Soft tissue swelling along the left face and mandibular region  There is a complex collection which appears to extend from the skin surface into a deeper abscess fluid collection which measures 2 4 x 1 8 x 1 8 cm  Complete right maxillary sinus opacification with opacification of the right anterior ethmoid air cells suggesting sinusitis  Emphysematous lung changes   Ground glass opacities could relate to multifocal pneumonia such as Covid 19 Workstation performed: [id number]                    Procedures  Procedures         ED Course  ED Course as of 12/26/21 1130   Sat Dec 25, 2021   1831 Will reach out to OMFS  Dr Sue Adame contacted  Will continue to monitor  Julian Cuellar 80 Message sent to Dr Nydia Morgan, ENT on call  0 Dr Nydia Morgan recommending transfer to [facility]  Will facilitate the patient's transfer  Dispo planning  1927 Transfer orders placed  2001 Spoke with Dr Alisha Alarcon with [facility] at [facility]  Patient will be admitted for ENT evaluation at [facility]  Patient will stay here overnight with transfer early in the AM  Will facilitate transfer orders  Will order diet but NPO after midnight just in case patient were to require surgery tomorrow  2159 Care transferred to [facility]                                SBIRT 22yo+      Most Recent Value   SBIRT (25 yo +)    In order to provide better care to our patients, we are screening all of our patients for alcohol and drug use  Would it be okay to ask you these screening questions? Yes Filed at: 12/25/2021 1552   Initial Alcohol Screen: US AUDIT-C     1  How often do you have a drink containing alcohol? 0 Filed at: 12/25/2021 1552   2  How many drinks containing alcohol do you have on a typical day you are drinking? 0 Filed at: 12/25/2021 1552   3a  Male UNDER 65: How often do you have five or more drinks on one occasion? 0 Filed at: 12/25/2021 1552   3b  FEMALE Any Age, or MALE 65+: How often do you have 4 or more drinks on one occassion? 0 Filed at: 12/25/2021 1552   Audit-C Score 0 Filed at: 12/25/2021 1552   BENJAMÍN: How many times in the past year have you    Used an illegal drug or used a prescription medication for non-medical reasons?  Never Filed at: 12/25/2021 1552                    MDM  Number of Diagnoses or Management Options  COVID-19: new and does not require workup  Facial abscess: new and requires workup  Right maxillary sinusitis: new and requires workup  Diagnosis management comments: This is a 53-year-old female presenting to the emergency department today for left-sided facial swelling since yesterday  This is associated with fevers but no fevers here  No evidence of airway obstruction  No trismus, hot potato voice, drooling, or inability to handle there patient's own secretions  The patient also has diffuse myalgias  She is concerned she might have COVID-19  Vital signs stable in the emergency department  Physical exam is significant for significant amount of left-sided facial swelling that does not appear to be coming from a dental source  It is also warm and fluctuance to the left side without any evidence of airway obstruction  The patient is indeed COVID positive  Lab workup grossly within normal limits  CT abdomen and pelvis significant for maxillary sinusitis in addition to a superficial and deep abscess to the left cheek which may remain appy descending from the patient's sinuses  I spoke with Dr Cary Menjivar with oral and maxillofacial surgery who recommended I reach out to ENT  I then discussed the case with Dr Lucrecia Way who recommend I start the patient on Unasyn and plan for transfer over to a more appropriate facility for admission  I reached out to [facility] to facilitate transfer over to [facility] in the morning  The patient was given a dose of Solu-Medrol in addition to Unasyn and Augmentin here in the emergency department  She is also given pain medication  The patient verifies understanding and agrees to the plan at this time  All questions answered to the patient's satisfaction  Please be advised that this patient was held in the ER for > 24 hours after transfer orders placed  Some orders placed were not placed by me         Amount and/or Complexity of Data Reviewed  Clinical lab tests: ordered and reviewed  Tests in the radiology section of CPT®: ordered and reviewed  Discuss the patient with other providers: yes (  Lilliam Zamorano Attending  Dr Joe Elise - ENT  Dr Browning Miss Admitting Attending)    Patient Progress  Patient progress: stable      Disposition  Final diagnoses:   Facial abscess   Right maxillary sinusitis   COVID-19     Time reflects when diagnosis was documented in both MDM as applicable and the Disposition within this note     Time User Action Codes Description Comment    12/25/2021  8:04 PM Idell Grand Add [L02 01] Facial abscess     12/25/2021  8:04 PM Idevahid Grand Add [J32 0] Right maxillary sinusitis     12/25/2021  8:04 PM Ramakrishna, [facility] [U07 1] COVID-19       ED Disposition     ED Disposition Condition Date/Time Comment    Transfer to Another Facility-In Network  SXB Dec 25, 2021  8:04 PM Shantell Bhatti should be transferred out to [facility]          MD Documentation      Most Recent Value   Patient Condition The patient has been stabilized such that within reasonable medical probability, no material deterioration of the patient condition or the condition of the unborn child(leah) is likely to result from the transfer   Reason for Transfer Level of Care needed not available at this facility   Benefits of Transfer Specialized equipment and/or services available at the receiving facility (Include comment)________________________  lOiver [facility] and OMFS Evaluation]   Accepting Physician Simran Cueva    (Name & Tel number) Darren Lynn   Transported by (Company and Unit #) [employer]   Sending MD Dr Hao Villareal and Philippe Anguiano PA-C   Provider Certification Unanticipated needs of medical equipment and personnel during transport, Risk of worsening condition, The possibility of a transport vehicle being unavailable, General risk, such as traffic hazards, adverse weather conditions, rough terrain or turbulence, possible failure of equipment (including vehicle or aircraft), or consequences of actions of persons outside the control of the transport personnel      RN Documentation      [address], Simran    (Name & Tel number) Benito Plasencia   Transported by (Company and Unit #) [employer]      Follow-up Information    None         Patient's Medications   Discharge Prescriptions    No medications on file       No discharge procedures on file      PDMP Review     None          ED Provider  Electronically Signed by           Joan Olivares PA-C  12/25/21 5357       Joan Olivares PA-C  12/29/21 1010

## 2021-12-27 ENCOUNTER — HOSPITAL ENCOUNTER (INPATIENT)
Facility: HOSPITAL | Age: 56
LOS: 5 days | Discharge: HOME | DRG: 157 | End: 2022-01-01
Attending: INTERNAL MEDICINE | Admitting: INTERNAL MEDICINE
Payer: MEDICARE

## 2021-12-27 VITALS
RESPIRATION RATE: 17 BRPM | DIASTOLIC BLOOD PRESSURE: 72 MMHG | SYSTOLIC BLOOD PRESSURE: 130 MMHG | HEART RATE: 78 BPM | BODY MASS INDEX: 24.13 KG/M2 | OXYGEN SATURATION: 96 % | HEIGHT: 60 IN | WEIGHT: 122.9 LBS | TEMPERATURE: 98.5 F

## 2021-12-27 DIAGNOSIS — L02.01 FACIAL ABSCESS: ICD-10-CM

## 2021-12-27 DIAGNOSIS — M27.2 MANDIBULAR ABSCESS: Primary | ICD-10-CM

## 2021-12-27 PROBLEM — U07.1 COVID-19: Status: ACTIVE | Noted: 2021-12-27

## 2021-12-27 PROBLEM — E87.1 HYPONATREMIA: Status: ACTIVE | Noted: 2021-12-27

## 2021-12-27 PROBLEM — F11.90 CHRONIC, CONTINUOUS USE OF OPIOIDS: Status: ACTIVE | Noted: 2021-12-27

## 2021-12-27 PROCEDURE — 96366 THER/PROPH/DIAG IV INF ADDON: CPT

## 2021-12-27 PROCEDURE — 96376 TX/PRO/DX INJ SAME DRUG ADON: CPT

## 2021-12-27 PROCEDURE — 99223 1ST HOSP IP/OBS HIGH 75: CPT | Performed by: INTERNAL MEDICINE

## 2021-12-27 PROCEDURE — 99203 OFFICE O/P NEW LOW 30 MIN: CPT | Performed by: INTERNAL MEDICINE

## 2021-12-27 RX ORDER — POLYETHYLENE GLYCOL 3350 17 G/17G
17 POWDER, FOR SOLUTION ORAL DAILY
Status: DISCONTINUED | OUTPATIENT
Start: 2021-12-28 | End: 2022-01-01 | Stop reason: HOSPADM

## 2021-12-27 RX ORDER — SODIUM CHLORIDE 9 MG/ML
75 INJECTION, SOLUTION INTRAVENOUS CONTINUOUS
Status: DISPENSED | OUTPATIENT
Start: 2021-12-27 | End: 2021-12-28

## 2021-12-27 RX ORDER — NICOTINE 21 MG/24HR
1 PATCH, TRANSDERMAL 24 HOURS TRANSDERMAL DAILY
Status: DISCONTINUED | OUTPATIENT
Start: 2021-12-27 | End: 2022-01-01 | Stop reason: HOSPADM

## 2021-12-27 RX ORDER — KETOROLAC TROMETHAMINE 30 MG/ML
15 INJECTION, SOLUTION INTRAMUSCULAR; INTRAVENOUS ONCE
Status: COMPLETED | OUTPATIENT
Start: 2021-12-27 | End: 2021-12-27

## 2021-12-27 RX ORDER — DOCUSATE SODIUM 100 MG/1
100 CAPSULE, LIQUID FILLED ORAL 2 TIMES DAILY
Status: DISCONTINUED | OUTPATIENT
Start: 2021-12-27 | End: 2022-01-01 | Stop reason: HOSPADM

## 2021-12-27 RX ORDER — ONDANSETRON 2 MG/ML
4 INJECTION INTRAMUSCULAR; INTRAVENOUS EVERY 6 HOURS PRN
Status: DISCONTINUED | OUTPATIENT
Start: 2021-12-27 | End: 2022-01-01 | Stop reason: HOSPADM

## 2021-12-27 RX ORDER — MAGNESIUM HYDROXIDE/ALUMINUM HYDROXICE/SIMETHICONE 120; 1200; 1200 MG/30ML; MG/30ML; MG/30ML
30 SUSPENSION ORAL EVERY 6 HOURS PRN
Status: DISCONTINUED | OUTPATIENT
Start: 2021-12-27 | End: 2022-01-01 | Stop reason: HOSPADM

## 2021-12-27 RX ORDER — ACETAMINOPHEN 325 MG/1
650 TABLET ORAL EVERY 6 HOURS PRN
Status: DISCONTINUED | OUTPATIENT
Start: 2021-12-27 | End: 2022-01-01 | Stop reason: HOSPADM

## 2021-12-27 RX ORDER — OXYCODONE HYDROCHLORIDE 5 MG/1
2.5 TABLET ORAL EVERY 6 HOURS PRN
Status: DISCONTINUED | OUTPATIENT
Start: 2021-12-27 | End: 2021-12-28

## 2021-12-27 RX ADMIN — DOCUSATE SODIUM 100 MG: 100 CAPSULE ORAL at 20:38

## 2021-12-27 RX ADMIN — ACETAMINOPHEN 975 MG: 325 TABLET, FILM COATED ORAL at 12:46

## 2021-12-27 RX ADMIN — NICOTINE 1 PATCH: 14 PATCH, EXTENDED RELEASE TRANSDERMAL at 20:38

## 2021-12-27 RX ADMIN — ACETAMINOPHEN 975 MG: 325 TABLET, FILM COATED ORAL at 03:38

## 2021-12-27 RX ADMIN — AMPICILLIN SODIUM AND SULBACTAM SODIUM 3 G: 2; 1 INJECTION, POWDER, FOR SOLUTION INTRAMUSCULAR; INTRAVENOUS at 03:43

## 2021-12-27 RX ADMIN — KETOROLAC TROMETHAMINE 15 MG: 30 INJECTION, SOLUTION INTRAMUSCULAR; INTRAVENOUS at 22:35

## 2021-12-27 RX ADMIN — SODIUM CHLORIDE 3 G: 9 INJECTION, SOLUTION INTRAVENOUS at 21:17

## 2021-12-27 RX ADMIN — OXYCODONE HYDROCHLORIDE 5 MG: 5 TABLET ORAL at 03:37

## 2021-12-27 RX ADMIN — SODIUM CHLORIDE 75 ML/HR: 0.9 INJECTION, SOLUTION INTRAVENOUS at 20:37

## 2021-12-27 RX ADMIN — MORPHINE SULFATE 2 MG: 2 INJECTION, SOLUTION INTRAMUSCULAR; INTRAVENOUS at 11:33

## 2021-12-27 RX ADMIN — MORPHINE SULFATE 2 MG: 2 INJECTION, SOLUTION INTRAMUSCULAR; INTRAVENOUS at 02:05

## 2021-12-27 RX ADMIN — MORPHINE SULFATE 2 MG: 2 INJECTION, SOLUTION INTRAMUSCULAR; INTRAVENOUS at 21:17

## 2021-12-27 RX ADMIN — OXYCODONE HYDROCHLORIDE 5 MG: 5 TABLET ORAL at 12:46

## 2021-12-27 RX ADMIN — AMPICILLIN SODIUM AND SULBACTAM SODIUM 3 G: 2; 1 INJECTION, POWDER, FOR SOLUTION INTRAMUSCULAR; INTRAVENOUS at 11:31

## 2021-12-27 NOTE — ASSESSMENT & PLAN NOTE
· Noted to be COVID - 19 positive 12/25  · Asymptomatic   · No treatment at this time  · Isolation through 1/4

## 2021-12-27 NOTE — ASSESSMENT & PLAN NOTE
Patient presented [facility] with left cheek/lip abscess with significant pain extending up to eye  Transferred to [place] for OMFS evaluation  · CT neck shows soft tissue swelling along the left face  Continue Unasyn  Consult ID  · Seen by OMFS, not odontogenic in origin  · Will consult Plastic surgery for further evaluation, likely needs I&D  Keep NPO pending evaluation  · P r n   Analgesia

## 2021-12-27 NOTE — CONSULTS
[address] [facility] 1965, 49 y o  female MRN: [id number]  Unit/Bed#: ED 16 Encounter: [id number]  Primary Care Provider: Edmundo Ho MD   Date and time admitted to hospital: 12/25/2021  3:24 PM    Consult to internal medicine  Consult performed by: Job Avendaño MD  Consult ordered by: Sharyle Crazier, MD          * Mandibular abscess  Assessment & Plan  As noted on ct scan  Currently awaiting transfer  C/w IV unasyn  Pain control as needed    COVID-19  Assessment & Plan  Incidental positive  No need for tx  On RA  C/w precautions        VTE Prophylaxis: Heparin  / sequential compression device     Recommendations for Discharge:  · na    Counseling / Coordination of Care Time: 30 minutes  Greater than 50% of total time spent on patient counseling and coordination of care  Collaboration of Care: Were Recommendations Directly Discussed with Primary Treatment Team? - Yes     History of Present Illness:    Madison Pitt is a 49 y o  female who is originally admitted to the general [id number] service due to mouth abscess  We are consulted for medically management as awaiting transfer  No acute complaints  Review of Systems:    Review of Systems   Constitutional: Negative for activity change, appetite change, chills, diaphoresis, fever and unexpected weight change  HENT: Negative for congestion, facial swelling and rhinorrhea  Eyes: Negative for photophobia and visual disturbance  Respiratory: Negative for cough, shortness of breath and wheezing  Cardiovascular: Negative for chest pain and palpitations  Gastrointestinal: Negative for abdominal pain, blood in stool, constipation, diarrhea, nausea and vomiting  Genitourinary: Negative for decreased urine volume, difficulty urinating, dysuria, flank pain, frequency, hematuria and urgency  Musculoskeletal: Negative for arthralgias, back pain, joint swelling and myalgias     Neurological: Negative for dizziness, syncope, facial asymmetry, light-headedness, numbness and headaches  Psychiatric/Behavioral: Negative for confusion and decreased concentration  The patient is not nervous/anxious  Past Medical and Surgical History:     Past Medical History:   Diagnosis Date    Chronic pain     neck       Past Surgical History:   Procedure Laterality Date    CERVICAL FUSION      NECK SURGERY         Meds/Allergies:    all medications and allergies reviewed    Allergies: No Known Allergies    Social History:     Marital Status: Single    Substance Use History:   Social History     Substance and Sexual Activity   Alcohol Use No     Social History     Tobacco Use   Smoking Status Current Every Day Smoker    Packs/day: 0 25    Types: Cigarettes   Smokeless Tobacco Never Used     Social History     Substance and Sexual Activity   Drug Use No       Family History:    History reviewed  No pertinent family history  Physical Exam:     Vitals:   Blood Pressure: 123/63 (12/27/21 0357)  Pulse: 60 (12/27/21 0357)  Temperature: 98 5 °F (36 9 °C) (12/27/21 0357)  Temp Source: Oral (12/27/21 0357)  Respirations: 16 (12/27/21 0357)  Height: 5' (152 4 cm) (12/25/21 1523)  Weight - Scale: 55 7 kg (122 lb 14 4 oz) (12/25/21 1523)  SpO2: 99 % (12/27/21 0357)    Physical Exam  Constitutional:       General: She is not in acute distress  Appearance: She is well-developed  She is not diaphoretic  HENT:      Head: Normocephalic and atraumatic  Nose: Nose normal       Mouth/Throat:      Pharynx: No oropharyngeal exudate  Eyes:      General: No scleral icterus  Right eye: No discharge  Left eye: No discharge  Conjunctiva/sclera: Conjunctivae normal    Neck:      Thyroid: No thyromegaly  Vascular: No JVD  Cardiovascular:      Rate and Rhythm: Normal rate and regular rhythm  Heart sounds: Normal heart sounds  No murmur heard  No friction rub  No gallop      Pulmonary:      Effort: Pulmonary effort is normal  No respiratory distress  Breath sounds: Normal breath sounds  No wheezing or rales  Chest:      Chest wall: No tenderness  Abdominal:      General: Bowel sounds are normal  There is no distension  Palpations: Abdomen is soft  Tenderness: There is no abdominal tenderness  There is no guarding or rebound  Musculoskeletal:         General: No tenderness or deformity  Normal range of motion  Cervical back: Normal range of motion and neck supple  Skin:     General: Skin is warm and dry  Findings: No erythema or rash  Neurological:      Mental Status: She is alert  Mental status is at baseline  Cranial Nerves: No cranial nerve deficit  Sensory: No sensory deficit  Motor: No abnormal muscle tone  Coordination: Coordination normal              Additional Data:     Lab Results: I have personally reviewed pertinent reports  Results from last 7 days   Lab Units 12/25/21  1546   WBC Thousand/uL 9 21   HEMOGLOBIN g/dL 13 2   HEMATOCRIT % 38 6   PLATELETS Thousands/uL 248   NEUTROS PCT % 73   LYMPHS PCT % 13*   MONOS PCT % 14*   EOS PCT % 0     Results from last 7 days   Lab Units 12/25/21  1546   SODIUM mmol/L 132*   POTASSIUM mmol/L 3 7   CHLORIDE mmol/L 94*   CO2 mmol/L 29   BUN mg/dL 20   CREATININE mg/dL 1 13*   ANION GAP mmol/L 9   CALCIUM mg/dL 9 0   ALBUMIN g/dL 4 1   TOTAL BILIRUBIN mg/dL 0 40   ALK PHOS U/L 46 2   ALT U/L 13   AST U/L 24   GLUCOSE RANDOM mg/dL 100             No results found for: HGBA1C      Results from last 7 days   Lab Units 12/25/21  1546   LACTIC ACID mmol/L 1 0       Imaging: I have personally reviewed pertinent reports  CT soft tissue neck   Final Result by Aniyah Cowan MD (12/25 1800)      Soft tissue swelling along the left face and mandibular region  There is a complex collection which appears to extend from the skin surface into a deeper abscess fluid collection which measures 2 4 x 1 8 x 1 8 cm        Complete right maxillary sinus opacification with opacification of the right anterior ethmoid air cells suggesting sinusitis  Emphysematous lung changes  Ground glass opacities could relate to multifocal pneumonia such as Covid 19         Workstation performed: [id number]             EKG, Pathology, and Other Studies Reviewed on Admission:   · EKG: reviewed    ** Please Note: This note has been constructed using a voice recognition system   **

## 2021-12-28 LAB
ALBUMIN SERPL BCP-MCNC: 2.8 G/DL (ref 3.5–5)
ALP SERPL-CCNC: 43 U/L (ref 46–116)
ALT SERPL W P-5'-P-CCNC: 19 U/L (ref 12–78)
AMPHETAMINES SERPL QL SCN: NEGATIVE
ANION GAP SERPL CALCULATED.3IONS-SCNC: 8 MMOL/L (ref 4–13)
AST SERPL W P-5'-P-CCNC: 23 U/L (ref 5–45)
BARBITURATES UR QL: NEGATIVE
BASOPHILS # BLD AUTO: 0.01 THOUSANDS/ΜL (ref 0–0.1)
BASOPHILS NFR BLD AUTO: 0 % (ref 0–1)
BENZODIAZ UR QL: NEGATIVE
BILIRUB SERPL-MCNC: 0.49 MG/DL (ref 0.2–1)
BUN SERPL-MCNC: 9 MG/DL (ref 5–25)
CALCIUM ALBUM COR SERPL-MCNC: 9.6 MG/DL (ref 8.3–10.1)
CALCIUM SERPL-MCNC: 8.6 MG/DL (ref 8.3–10.1)
CHLORIDE SERPL-SCNC: 101 MMOL/L (ref 100–108)
CO2 SERPL-SCNC: 28 MMOL/L (ref 21–32)
COCAINE UR QL: POSITIVE
CREAT SERPL-MCNC: 0.61 MG/DL (ref 0.6–1.3)
EOSINOPHIL # BLD AUTO: 0.01 THOUSAND/ΜL (ref 0–0.61)
EOSINOPHIL NFR BLD AUTO: 0 % (ref 0–6)
ERYTHROCYTE [DISTWIDTH] IN BLOOD BY AUTOMATED COUNT: 12.8 % (ref 11.6–15.1)
GFR SERPL CREATININE-BSD FRML MDRD: 103 ML/MIN/1.73SQ M
GLUCOSE SERPL-MCNC: 87 MG/DL (ref 65–140)
HCT VFR BLD AUTO: 39.4 % (ref 34.8–46.1)
HGB BLD-MCNC: 13.5 G/DL (ref 11.5–15.4)
IMM GRANULOCYTES # BLD AUTO: 0.01 THOUSAND/UL (ref 0–0.2)
IMM GRANULOCYTES NFR BLD AUTO: 0 % (ref 0–2)
LYMPHOCYTES # BLD AUTO: 1.14 THOUSANDS/ΜL (ref 0.6–4.47)
LYMPHOCYTES NFR BLD AUTO: 20 % (ref 14–44)
MCH RBC QN AUTO: 30.3 PG (ref 26.8–34.3)
MCHC RBC AUTO-ENTMCNC: 34.3 G/DL (ref 31.4–37.4)
MCV RBC AUTO: 89 FL (ref 82–98)
METHADONE UR QL: POSITIVE
MONOCYTES # BLD AUTO: 0.44 THOUSAND/ΜL (ref 0.17–1.22)
MONOCYTES NFR BLD AUTO: 8 % (ref 4–12)
NEUTROPHILS # BLD AUTO: 4.15 THOUSANDS/ΜL (ref 1.85–7.62)
NEUTS SEG NFR BLD AUTO: 72 % (ref 43–75)
NRBC BLD AUTO-RTO: 0 /100 WBCS
OPIATES UR QL SCN: POSITIVE
OXYCODONE+OXYMORPHONE UR QL SCN: POSITIVE
PCP UR QL: NEGATIVE
PLATELET # BLD AUTO: 197 THOUSANDS/UL (ref 149–390)
PMV BLD AUTO: 11 FL (ref 8.9–12.7)
POTASSIUM SERPL-SCNC: 3.1 MMOL/L (ref 3.5–5.3)
PROT SERPL-MCNC: 6.6 G/DL (ref 6.4–8.2)
RBC # BLD AUTO: 4.45 MILLION/UL (ref 3.81–5.12)
SODIUM SERPL-SCNC: 137 MMOL/L (ref 136–145)
THC UR QL: NEGATIVE
WBC # BLD AUTO: 5.76 THOUSAND/UL (ref 4.31–10.16)

## 2021-12-28 PROCEDURE — 80307 DRUG TEST PRSMV CHEM ANLYZR: CPT | Performed by: STUDENT IN AN ORGANIZED HEALTH CARE EDUCATION/TRAINING PROGRAM

## 2021-12-28 PROCEDURE — 99233 SBSQ HOSP IP/OBS HIGH 50: CPT | Performed by: STUDENT IN AN ORGANIZED HEALTH CARE EDUCATION/TRAINING PROGRAM

## 2021-12-28 PROCEDURE — 99223 1ST HOSP IP/OBS HIGH 75: CPT | Performed by: OTOLARYNGOLOGY

## 2021-12-28 PROCEDURE — 85025 COMPLETE CBC W/AUTO DIFF WBC: CPT | Performed by: INTERNAL MEDICINE

## 2021-12-28 PROCEDURE — 80053 COMPREHEN METABOLIC PANEL: CPT | Performed by: INTERNAL MEDICINE

## 2021-12-28 PROCEDURE — 99232 SBSQ HOSP IP/OBS MODERATE 35: CPT | Performed by: NURSE PRACTITIONER

## 2021-12-28 RX ORDER — HYDROMORPHONE HCL IN WATER/PF 6 MG/30 ML
0.2 PATIENT CONTROLLED ANALGESIA SYRINGE INTRAVENOUS EVERY 6 HOURS PRN
Status: DISCONTINUED | OUTPATIENT
Start: 2021-12-28 | End: 2022-01-01 | Stop reason: HOSPADM

## 2021-12-28 RX ORDER — METHADONE HYDROCHLORIDE 10 MG/ML
40 CONCENTRATE ORAL DAILY
Status: DISCONTINUED | OUTPATIENT
Start: 2021-12-28 | End: 2022-01-01 | Stop reason: HOSPADM

## 2021-12-28 RX ORDER — POTASSIUM CHLORIDE 20 MEQ/1
40 TABLET, EXTENDED RELEASE ORAL ONCE
Status: COMPLETED | OUTPATIENT
Start: 2021-12-28 | End: 2021-12-28

## 2021-12-28 RX ORDER — OXYCODONE HYDROCHLORIDE 5 MG/1
2.5 TABLET ORAL EVERY 4 HOURS PRN
Status: DISCONTINUED | OUTPATIENT
Start: 2021-12-28 | End: 2022-01-01 | Stop reason: HOSPADM

## 2021-12-28 RX ORDER — OXYCODONE HYDROCHLORIDE 5 MG/1
5 TABLET ORAL EVERY 4 HOURS PRN
Status: DISCONTINUED | OUTPATIENT
Start: 2021-12-28 | End: 2022-01-01 | Stop reason: HOSPADM

## 2021-12-28 RX ADMIN — DOCUSATE SODIUM 100 MG: 100 CAPSULE ORAL at 17:48

## 2021-12-28 RX ADMIN — HYDROMORPHONE HYDROCHLORIDE 0.2 MG: 0.2 INJECTION, SOLUTION INTRAMUSCULAR; INTRAVENOUS; SUBCUTANEOUS at 18:34

## 2021-12-28 RX ADMIN — OXYCODONE HYDROCHLORIDE 5 MG: 5 TABLET ORAL at 16:16

## 2021-12-28 RX ADMIN — SODIUM CHLORIDE 3 G: 9 INJECTION, SOLUTION INTRAVENOUS at 20:49

## 2021-12-28 RX ADMIN — SODIUM CHLORIDE 3 G: 9 INJECTION, SOLUTION INTRAVENOUS at 08:17

## 2021-12-28 RX ADMIN — NICOTINE 1 PATCH: 14 PATCH, EXTENDED RELEASE TRANSDERMAL at 08:10

## 2021-12-28 RX ADMIN — SODIUM CHLORIDE 3 G: 9 INJECTION, SOLUTION INTRAVENOUS at 03:17

## 2021-12-28 RX ADMIN — SODIUM CHLORIDE 3 G: 9 INJECTION, SOLUTION INTRAVENOUS at 13:53

## 2021-12-28 RX ADMIN — OXYCODONE HYDROCHLORIDE 5 MG: 5 TABLET ORAL at 11:59

## 2021-12-28 RX ADMIN — POTASSIUM CHLORIDE 40 MEQ: 1500 TABLET, EXTENDED RELEASE ORAL at 11:59

## 2021-12-28 RX ADMIN — SODIUM CHLORIDE 75 ML/HR: 0.9 INJECTION, SOLUTION INTRAVENOUS at 06:30

## 2021-12-28 RX ADMIN — MORPHINE SULFATE 2 MG: 2 INJECTION, SOLUTION INTRAMUSCULAR; INTRAVENOUS at 08:15

## 2021-12-28 RX ADMIN — VANCOMYCIN HYDROCHLORIDE 750 MG: 750 INJECTION, SOLUTION INTRAVENOUS at 17:48

## 2021-12-28 RX ADMIN — Medication 40 MG: at 18:27

## 2021-12-28 NOTE — CASE MANAGEMENT
Case Management Progress Note    Patient name Marni Camejo  [facility] [id number]  : 1965 Date 2021       LOS (days): 1  Geometric Mean LOS (GMLOS) (days):   Days to GMLOS:        OBJECTIVE:        Current admission status: Inpatient  Preferred Pharmacy:   UNKNOWN - FOLLOW UP PRIOR TO DISCHARGE TO E-PRESCRIBE  No address on file      [address] ([address] [facility], [address] [facility] ([address])  [id number] [place] [address] [id number] [phone])  [place] [address]  Phone: [phone] Fax: [phone]    Primary Care Provider: Christa Seo MD    Primary Insurance: MEDICARE  Secondary Insurance: [facility] [place]    PROGRESS NOTE: Medical record release for records to go to [facility] in [place]  Patient agreed and signed MR release    Faxed to medical records

## 2021-12-28 NOTE — PLAN OF CARE
Problem: PAIN - ADULT  Goal: Verbalizes/displays adequate comfort level or baseline comfort level  Description: Interventions:  - Encourage patient to monitor pain and request assistance  - Assess pain using appropriate pain scale  - Administer analgesics based on type and severity of pain and evaluate response  - Implement non-pharmacological measures as appropriate and evaluate response  - Consider cultural and social influences on pain and pain management  - Notify physician/advanced practitioner if interventions unsuccessful or patient reports new pain  Outcome: Progressing     Problem: INFECTION - ADULT  Goal: Absence or prevention of progression during hospitalization  Description: INTERVENTIONS:  - Assess and monitor for signs and symptoms of infection  - Monitor lab/diagnostic results  - Monitor all insertion sites, i e  indwelling lines, tubes, and drains  - [place] appropriate cooling/warming therapies per order  - Administer medications as ordered  - Instruct and encourage patient and family to use good hand hygiene technique  - Identify and instruct in appropriate isolation precautions for identified infection/condition  Outcome: Progressing  Goal: Absence of fever/infection during neutropenic period  Description: INTERVENTIONS:  - Monitor WBC    Outcome: Progressing     Problem: SAFETY ADULT  Goal: Patient will remain free of falls  Description: INTERVENTIONS:  - Educate patient/family on patient safety including physical limitations  - Instruct patient to call for assistance with activity   - Consult OT/PT to assist with strengthening/mobility   - Keep Call bell within reach  - Keep bed low and locked with side rails adjusted as appropriate  - Keep care items and personal belongings within reach  - Initiate and maintain comfort rounds  - Make Fall Risk Sign visible to staff  - Offer Toileting every 2 Hours, in advance of need  - Initiate/Maintain alarm  - Obtain necessary fall risk management equipment:  - Apply yellow socks and bracelet for high fall risk patients  - Consider moving patient to room near nurses station  Outcome: Progressing  Goal: Maintain or return to baseline ADL function  Description: INTERVENTIONS:  -  Assess patient's ability to carry out ADLs; assess patient's baseline for ADL function and identify physical deficits which impact ability to perform ADLs (bathing, care of mouth/teeth, toileting, grooming, dressing, etc )  - Assess/evaluate cause of self-care deficits   - Assess range of motion  - Assess patient's mobility; develop plan if impaired  - Assess patient's need for assistive devices and provide as appropriate  - Encourage maximum independence but intervene and supervise when necessary  - Involve family in performance of ADLs  - Assess for home care needs following discharge   - Consider OT consult to assist with ADL evaluation and planning for discharge  - Provide patient education as appropriate  Outcome: Progressing  Goal: Maintains/Returns to pre admission functional level  Description: INTERVENTIONS:  - Perform BMAT or MOVE assessment daily    - Set and communicate daily mobility goal to care team and patient/family/caregiver     - Collaborate with rehabilitation services on mobility goals if consulted  - Ambulate patient   - Out of bed to chair   - Out of bed for meals   - Out of bed for toileting  - Record patient progress and toleration of activity level   Outcome: Progressing     Problem: DISCHARGE PLANNING  Goal: Discharge to home or other facility with appropriate resources  Description: INTERVENTIONS:  - Identify barriers to discharge w/patient and caregiver  - Arrange for needed discharge resources and transportation as appropriate  - Identify discharge learning needs (meds, wound care, etc )  - Arrange for interpretive services to assist at discharge as needed  - Refer to Case Management Department for coordinating discharge planning if the patient needs post-hospital services based on physician/advanced practitioner order or complex needs related to functional status, cognitive ability, or social support system  Outcome: Progressing     Problem: Knowledge Deficit  Goal: Patient/family/caregiver demonstrates understanding of disease process, treatment plan, medications, and discharge instructions  Description: Complete learning assessment and assess knowledge base    Interventions:  - Provide teaching at level of understanding  - Provide teaching via preferred learning methods  Outcome: Progressing     Problem: RESPIRATORY - ADULT  Goal: Achieves optimal ventilation and oxygenation  Description: INTERVENTIONS:  - Assess for changes in respiratory status  - Assess for changes in mentation and behavior  - Position to facilitate oxygenation and minimize respiratory effort  - Oxygen administered by appropriate delivery if ordered  - Initiate smoking cessation education as indicated  - Encourage broncho-pulmonary hygiene including cough, deep breathe, Incentive Spirometry  - Assess the need for suctioning and aspirate as needed  - Assess and instruct to report SOB or any respiratory difficulty  - Respiratory Therapy support as indicated  Outcome: Progressing     Problem: SKIN/TISSUE INTEGRITY - ADULT  Goal: Skin Integrity remains intact(Skin Breakdown Prevention)  Description: Assess:  -Perform Ravi assessment  -Clean and moisturize skin   -Inspect skin when repositioning, toileting, and assisting with ADLS  -Assess under medical devices  -Assess extremities for adequate circulation and sensation     Bed Management:  -Have minimal linens on bed & keep smooth, unwrinkled  -Change linens as needed when moist or perspiring    Toileting:  -Offer bedside commode    Activity:  -Mobilize patient   -Encourage activity and walks on unit  -Encourage or provide ROM exercises   -Turn and reposition patient every 2 Hours  -Use appropriate equipment to lift or move patient in bed    Skin Care:  -Avoid use of baby powder, tape, friction and shearing, hot water or constrictive clothing  -Relieve pressure over bony prominences   -Do not massage red bony areas    Outcome: Progressing  Goal: Incision(s), wounds(s) or drain site(s) healing without S/S of infection  Description: INTERVENTIONS  - Assess and document dressing, incision, wound bed, drain sites and surrounding tissue  - Provide patient and family education  - Perform skin care/dressing changes   Outcome: Progressing  Goal: Pressure injury heals and does not worsen  Description: Interventions:  - Implement low air loss mattress or specialty surface (Criteria met)  - Apply silicone foam dressing  - Use special pressure reducing interventions   - Apply fecal or urinary incontinence containment device    - Utilize friction reducing device or surface for transfers   - Consider nutrition services referral as needed  Outcome: Progressing

## 2021-12-28 NOTE — ASSESSMENT & PLAN NOTE
· Patient with history of substance abuse maintained on methadone  Verified with [address] [place] [address] patient takes 40 mg daily  · Of note UDS positive for oxycodone, cocaine, opiates

## 2021-12-28 NOTE — CONSULTS
Oral and Maxillofacial Surgery Consult    Pt seen 12/28/21 8:47 AM     Assessment  49 y o  female  evaluated for left facial abscess  On bedside exam, pt has isolated left cheek swelling with overlying skin erythema  She has an extra oral healing fistula  She is edentulous with no vestibular swelling in maxilla or mandible  CT neck w/ contrast shows soft tissue swelling along left face  Plan:  - Not odontogenic in origin, please consult ENT for further evaluation  - Analgesia as per primary team  - Rx abx continue unasyn 3g q6h  - Encourage good oral hygiene  - HOB  - No ice to face, heat pack ok  D/w OMFS attg on call    Inpatient consult to Oral and Maxillofacial Surgery  Consult performed by: Maryam East DDS  Consult ordered by: Santana Bernal MD      HPI: 49 y o  female w current COVID infection, hx of opioid use on methadone, and spinal fusion who was transferred from [facility] to [facility] for evaluation of left facial abscess  Reports that she had all of her teeth extraction ~6 months ago with no complications immediately post op  She developed progressively worsening left facial swelling  Felt a pimple develop on her cheek which popped, relieving much of the pressure  Complaining of pain to left cheek  Denies fever, chills, nausea, odynophagia, dysphagia, dyspnea, shortness of breath       PMH:   Past Medical History:   Diagnosis Date    Chronic pain     neck      Allergies:   No Known Allergies    Meds:     Current Facility-Administered Medications:     acetaminophen (TYLENOL) tablet 650 mg, 650 mg, Oral, Q6H PRN, Santana Bernal MD    aluminum-magnesium hydroxide-simethicone (MYLANTA) oral suspension 30 mL, 30 mL, Oral, Q6H PRN, Santana Bernal MD    ampicillin-sulbactam (UNASYN) 3 g in sodium chloride 0 9 % 100 mL IVPB, 3 g, Intravenous, Q6H, Santana Bernal MD, Last Rate: 200 mL/hr at 12/28/21 0817, 3 g at 12/28/21 0817    docusate sodium (COLACE) capsule 100 mg, 100 mg, Oral, BID, Tara Jacob MD, 100 mg at 12/27/21 2038    morphine injection 2 mg, 2 mg, Intravenous, Q4H PRN, Tara Jacob MD, 2 mg at 12/28/21 0815    multivitamin-minerals (CENTRUM) tablet 1 tablet, 1 tablet, Oral, Daily, Tara Jacob MD    nicotine (NICODERM CQ) 14 mg/24hr TD 24 hr patch 1 patch, 1 patch, Transdermal, Daily, Tara Jacob MD, 1 patch at 12/28/21 0810    ondansetron (ZOFRAN) injection 4 mg, 4 mg, Intravenous, Q6H PRN, Tara Jacob MD    oxyCODONE (ROXICODONE) IR tablet 2 5 mg, 2 5 mg, Oral, Q6H PRN, Tara Jacob MD    polyethylene glycol (MIRALAX) packet 17 g, 17 g, Oral, Daily, Tara Jacob MD    sodium chloride 0 9 % infusion, 75 mL/hr, Intravenous, Continuous, Tara Jacob MD, Last Rate: 75 mL/hr at 12/28/21 0630, 75 mL/hr at 12/28/21 0630    PSH:   Past Surgical History:   Procedure Laterality Date    CERVICAL FUSION      NECK SURGERY        No family history on file  Review of Systems   Constitutional: Negative for chills, diaphoresis, fatigue and fever  HENT: Positive for facial swelling  Negative for dental problem and trouble swallowing  Eyes: Negative for photophobia and visual disturbance  Respiratory: Negative for chest tightness and shortness of breath  Cardiovascular: Negative for chest pain  Gastrointestinal: Negative for abdominal pain  Genitourinary: Negative for dysuria  Musculoskeletal: Negative for neck pain and neck stiffness  Neurological: Positive for facial asymmetry  Negative for light-headedness, numbness and headaches  Psychiatric/Behavioral: Negative for behavioral problems and confusion          Temp:  [97 8 °F (36 6 °C)-98 2 °F (36 8 °C)] 98 2 °F (36 8 °C)  HR:  [72-78] 78  Resp:  [17-18] 18  BP: (130-138)/(69-84) 138/84  SpO2:  [94 %-96 %] 94 %       Intake/Output Summary (Last 24 hours) at 12/28/2021 0847  Last data filed at 12/28/2021 0630  Gross per 24 hour   Intake 2000 ml   Output --   Net 2000 ml        Physical Exam:  GE: AAOx3  NAD  HEENT:    Eyes: EOMI, no pain on eye movement  Head: moderate left facial swelling isolated to left cheek  Skin overlying area of swelling is erythematous and tender to palpation  Area of fistula on left cheek healing  Inferior border of the mandible is palpable  Mouth: ~30mm KRIS  Edentulous  No vestibular swelling or tenderness to palpation over alveolar crest  FOM soft/non-tender/non-elevated  Uvula midline  Resp: no respiratory distress on RA  CVS: RRR    Lab Results: I have personally reviewed pertinent lab results  Imaging: I have personally reviewed pertinent reports  Counseling / Coordination of Care  Total floor / unit time spent today 30 minutes  Greater than 50% of total time was spent with the patient and / or family counseling and / or coordination of care

## 2021-12-28 NOTE — PROGRESS NOTES
[facility]  Progress Note - Berlin Delatorre 1965, 49 y o  female MRN: [id number]  [facility] Encounter: [id number]  Primary Care Provider: Thomas Olivo MD   Date and time admitted to hospital: 12/27/2021  6:42 PM    * Facial abscess  Assessment & Plan  Patient presented [facility] with left cheek/lip abscess with significant pain extending up to eye  Transferred to [facility] for OMFS evaluation  · CT neck shows soft tissue swelling along the left face  Continue Unasyn  Add Vanco for MRSA coverage  Consult ID  · Seen by OMFS, not odontogenic in origin  · Will consult ENT for further evaluation, likely needs I&D  Keep NPO pending evaluation  · P r n  Analgesia    COVID-19  Assessment & Plan  · Noted to be COVID - 19 positive 12/25  · Asymptomatic   · No treatment at this time  · Isolation through 1/4    Chronic, continuous use of opioids  Assessment & Plan  · Patient with history of substance abuse maintained on methadone  Verified with [address] [place] [address] Place patient takes 40 mg daily  · Of note UDS positive for oxycodone, cocaine, opiates  Hyponatremia  Assessment & Plan  · Improved with intravenous fluids      VTE Pharmacologic Prophylaxis: VTE Score: 1 Moderate Risk (Score 3-4) - Pharmacological DVT Prophylaxis Contraindicated  Sequential Compression Devices Ordered  Patient Centered Rounds: I performed bedside rounds with nursing staff today  Discussions with Specialists or Other Care Team Provider: nursing, case management, [facility], plastic surgery, ENT    Education and Discussions with Family / Patient: Patient declined call to   Time Spent for Care: 30 minutes  More than 50% of total time spent on counseling and coordination of care as described above      Current Length of Stay: 1 day(s)  Current Patient Status: Inpatient   Certification Statement: The patient will continue to require additional inpatient hospital stay due to Intravenous antibiotics, ID and plastic surgery evaluation  Discharge Plan: Anticipate discharge in >72 hrs to home  Code Status: Level 1 - Full Code    Subjective:   Patient reports improvement in swelling of left face  States that area started as a small pimple and began having significant erythema and swelling several days ago  No subjective fevers or chills  Objective:     Vitals:   Temp (24hrs), Av °F (36 7 °C), Min:97 8 °F (36 6 °C), Max:98 2 °F (36 8 °C)    Temp:  [97 8 °F (36 6 °C)-98 2 °F (36 8 °C)] 98 2 °F (36 8 °C)  HR:  [72-78] 78  Resp:  [17-18] 18  BP: (130-138)/(69-84) 138/84  SpO2:  [94 %-96 %] 94 %  There is no height or weight on file to calculate BMI  Input and Output Summary (last 24 hours): Intake/Output Summary (Last 24 hours) at 2021 1112  Last data filed at 2021 0900  Gross per 24 hour   Intake 2000 ml   Output --   Net 2000 ml       Physical Exam:   Physical Exam  Vitals and nursing note reviewed  HENT:      Head:      Comments: Left face swelling and induration  Scab noted to cheek  Cardiovascular:      Rate and Rhythm: Normal rate  Pulmonary:      Breath sounds: Normal breath sounds  Abdominal:      Tenderness: There is no abdominal tenderness  Musculoskeletal:         General: No swelling  Skin:     General: Skin is warm  Neurological:      Mental Status: She is alert and oriented to person, place, and time  Mental status is at baseline     Psychiatric:         Mood and Affect: Mood normal           Additional Data:     Labs:  Results from last 7 days   Lab Units 21  0551   WBC Thousand/uL 5 76   HEMOGLOBIN g/dL 13 5   HEMATOCRIT % 39 4   PLATELETS Thousands/uL 197   NEUTROS PCT % 72   LYMPHS PCT % 20   MONOS PCT % 8   EOS PCT % 0     Results from last 7 days   Lab Units 21  0552   SODIUM mmol/L 137   POTASSIUM mmol/L 3 1*   CHLORIDE mmol/L 101   CO2 mmol/L 28   BUN mg/dL 9   CREATININE mg/dL 0 61   ANION GAP mmol/L 8 CALCIUM mg/dL 8 6   ALBUMIN g/dL 2 8*   TOTAL BILIRUBIN mg/dL 0 49   ALK PHOS U/L 43*   ALT U/L 19   AST U/L 23   GLUCOSE RANDOM mg/dL 87                 Results from last 7 days   Lab Units 12/25/21  1546   LACTIC ACID mmol/L 1 0       Lines/Drains:  Invasive Devices  Report    Peripheral Intravenous Line            Peripheral IV 12/28/21 Left Forearm <1 day    Peripheral IV 12/28/21 Right Antecubital <1 day                      Imaging: Reviewed radiology reports from this admission including: CT neck    Recent Cultures (last 7 days):         Last 24 Hours Medication List:   Current Facility-Administered Medications   Medication Dose Route Frequency Provider Last Rate    acetaminophen  650 mg Oral Q6H PRN Tara Jacob MD      aluminum-magnesium hydroxide-simethicone  30 mL Oral Q6H PRN Tara Jacob MD      ampicillin-sulbactam  3 g Intravenous Q6H Tara Jacob MD 3 g (12/28/21 0817)    docusate sodium  100 mg Oral BID Tara Jacob MD      HYDROmorphone  0 2 mg Intravenous Q6H PRN FANG Shaw      methadone  40 mg Oral Daily FANG Schreiber      multivitamin-minerals  1 tablet Oral Daily Tara Jacob MD      nicotine  1 patch Transdermal Daily Tara Jacob MD      ondansetron  4 mg Intravenous Q6H PRN Tara Jacob MD      oxyCODONE  2 5 mg Oral Q4H PRN FANG Schreiber      oxyCODONE  5 mg Oral Q4H PRN FANG Schreiber      polyethylene glycol  17 g Oral Daily Tara Jacob MD      potassium chloride  40 mEq Oral Once FANG Chan      sodium chloride  75 mL/hr Intravenous Continuous Tara Jacob MD 75 mL/hr (12/28/21 0630)        Today, Patient Was Seen By: FANG Chan    **Please Note: This note may have been constructed using a voice recognition system  **

## 2021-12-28 NOTE — CONSULTS
OTOLARYNGOLOGY CONSULT    Date of Service: 12/28/2021    Reason for consult: facial asbcess     ASSESSMENT/PLAN:  Verenice Awad is a 49 y o  female who we are consulted on for left facial abscess  She has a 2cm fluctuant spontaneously draining abscess along her Left oral commissure from skin origin, likely MRSA  Discussed bedside drainage, she would like to go to the OR  Will try and add the case on for tomorrow PM, this is complicated by her COVID + status    -Plan for bedside vs or I&D tomorrow evening  -Warm compresses TID, manual expression  -Recommend broadening to cover for MRSA  -NPOPM    HPI  Verenice Awad is a 49 y o  female with PMH as below who is here today for evaluation of left facial swelling  Came out of no where, has had some drainage from the lip cheek regions  Has not had issues with this before   COVID + on admit without symptoms    CURRENT HOSPITAL MEDICATIONS  Current Facility-Administered Medications   Medication Dose Route Frequency Provider Last Rate Last Admin    acetaminophen (TYLENOL) tablet 650 mg  650 mg Oral Q6H PRN Sukhdeep Kamara MD        aluminum-magnesium hydroxide-simethicone (MYLANTA) oral suspension 30 mL  30 mL Oral Q6H PRN Sukhdeep Kamara MD        ampicillin-sulbactam (UNASYN) 3 g in sodium chloride 0 9 % 100 mL IVPB  3 g Intravenous Q6H Sukhdeep Kamara  mL/hr at 12/28/21 0817 3 g at 12/28/21 2666    docusate sodium (COLACE) capsule 100 mg  100 mg Oral BID Sukhdeep Kamara MD   100 mg at 12/27/21 2038    HYDROmorphone HCl (DILAUDID) injection 0 2 mg  0 2 mg Intravenous Q6H PRN FANG Schreiber        methadone (DOLOPHINE) oral concentrated solution 40 mg  40 mg Oral Daily FANG Schreiber        multivitamin-minerals (CENTRUM) tablet 1 tablet  1 tablet Oral Daily Sukhdeep Kamara MD        nicotine (NICODERM CQ) 14 mg/24hr TD 24 hr patch 1 patch  1 patch Transdermal Daily Sukhdeep Kamara MD   1 patch at 12/28/21 0810    ondansetron (ZOFRAN) injection 4 mg  4 mg Intravenous Q6H PRN Tara Jacob MD        oxyCODONE (ROXICODONE) IR tablet 2 5 mg  2 5 mg Oral Q4H PRN FANG Schreiber        oxyCODONE (ROXICODONE) IR tablet 5 mg  5 mg Oral Q4H PRN FANG Schreiber   5 mg at 12/28/21 1159    polyethylene glycol (MIRALAX) packet 17 g  17 g Oral Daily Tara Jacob MD        sodium chloride 0 9 % infusion  75 mL/hr Intravenous Continuous Tara Jacob MD 75 mL/hr at 12/28/21 0630 75 mL/hr at 12/28/21 0630       REVIEW OF SYSTEMS  As above; otherwise:  Constitutional: no fever, chills  HEENT: No headache, no vision changes  CV: No chest pain  Resp: No cough, no shortness of breath  GI: No nausea, vomiting, diarrhea, constipation  Neuro: No numbness, tingling  Skin: No rash  Endo: No heat or cold intolerance  Musculoskeletal: No joint pain  Heme: No easy bruising    HISTORIES  PMH:  Past Medical History:   Diagnosis Date    Chronic pain     neck       PSH:  Past Surgical History:   Procedure Laterality Date    CERVICAL FUSION      NECK SURGERY         SocHx:  Social History     Tobacco Use    Smoking status: Current Every Day Smoker     Packs/day: 0 25     Types: Cigarettes    Smokeless tobacco: Never Used   Substance Use Topics    Alcohol use: No    Drug use: No       FH:  No family history on file  ALLERGIES:  No Known Allergies    PHYSICAL EXAM  General: NAD, AOx4  Eyes:  EOMI, PERRL  Ears:  External ears normal in appearance   Nose:  External appearance normal, no septal deviation   Oral cavity:  No trismus, no mass/lesions, 2cm fluctuance along left check, draining to commisue  Neck: Trachea is midline; no thyroid nodules, Salivary glands symmetrical, no masses/abnormality on palpation  Lymph:  No cervical lymphadenopathy  Skin:  No obvious facial lesions  Neuro: Motor and sensory grossly intact   Face symmetrical, no obvious cranial nerve palsies,motor and sensory grossly intact, no focal deficits     Lungs:  Normal work of breathing, symmetrical chest expansion  Vascular: Well perfused      LABORATORY  Reviewed    PROCEDURES  None, refused bedside I&D    RADIOLOGY  CT showing 1 5/2cm early abscess based along left lip    Patient Active Problem List    Diagnosis Date Noted    Mandibular abscess 12/27/2021    COVID-19 12/27/2021    Facial abscess 12/27/2021    Chronic, continuous use of opioids 12/27/2021    Hyponatremia 12/27/2021         Vernon Jones MD MPH  Otolaryngology--Head and Neck Surgery  [employer]  12/28/2021 1:24 PM

## 2021-12-28 NOTE — CONSULTS
Consultation - Infectious Disease   Yoselin Bui 49 y o  female MRN: [id number]  Unit/Bed#: [facility] 628-01 Encounter: [id number]      IMPRESSION & RECOMMENDATIONS:     1  Left facial abscess  Developed after she attempted to drain a pimple on her face, so concerning for skin rather than dental origin (patient also had all her teeth removed)  Not improving with antibiotic therapy alone  Plan for I&D with ENT tomorrow    -for now, continue Unasyn   -agree with addition of vancomycin prashant MRSA coverage, dosing by pharmacy   -please collect aerobic and anaerobic cultures with I&D   -ENT following   -monitor facial exam    2  COVID-19 infection  Patient asymptomatic, incidentally discovered on admission  She has received 2 doses Moderna vaccine but was not yet eligible for booster      -no COVID therapies indicated   -complete 10 days isolation    3  Chronic opioid use  On chronic methadone maintenance  UDS positive for cocaine, oxycodone, methadone    -continue chronic methadone    I have discussed the above management plan in detail with the [facility] PA and patient  ID will follow  I have performed an extensive review of the medical records in Epic including review of the notes, radiographs, and laboratory results     HISTORY OF PRESENT ILLNESS:  Reason for Consult: facial abscess  HPI: Yoselin Bui is a 49 y  o  woman with a history of cervical spine fusion, chronic opioid use now on methadone maintenance who presented to [facility] ED 12/25/21 with one day of left sided facial swelling and pain  She states this started after she tried to "pop" and pimple at the left corner of her lips  The patient had her teeth extracted over 6 months ago and currently wears dentures  She denies any oral pain or drainage, no fever or chills  On presentation to the [facility] ED, she was afebrile without leukocytosis  CT of the face and neck showed a left facial abscess  She was started on IV Unasyn   The patient was found to be COVID PCR positive, but denies cough, shortness of breath, sore through, nasal congestion  She received two doses of the [address] vaccine, but was not yet eligible for the booster  The patient was transferred to [facility] yesterday for OMFS evaluation  ID is consulted for antibiotic management  On evaluation, the patient states that her left sided facial pain and swelling has worsened since admission  She now feels like it is extending to her neck, making it more painful to open her mouth or swallow  She has no fever or chills  She was seen by OMFS, who felt the abscess was not odontogenic is origin, and ENT was consulted  They are planning I&D tomorrow  REVIEW OF SYSTEMS:  A complete review of systems is negative other than that noted in the HPI  PAST MEDICAL HISTORY:  Past Medical History:   Diagnosis Date    Chronic pain     neck     Past Surgical History:   Procedure Laterality Date    CERVICAL FUSION      NECK SURGERY         FAMILY HISTORY:  Non-contributory  No recurrent infections    SOCIAL HISTORY:  Social History   Social History     Substance and Sexual Activity   Alcohol Use No     Social History     Substance and Sexual Activity   Drug Use No     Social History     Tobacco Use   Smoking Status Current Every Day Smoker    Packs/day: 0 25    Types: Cigarettes   Smokeless Tobacco Never Used       ALLERGIES:  No Known Allergies    MEDICATIONS:  All current active medications have been reviewed      PHYSICAL EXAM:  Temp:  [97 5 °F (36 4 °C)-98 2 °F (36 8 °C)] 97 5 °F (36 4 °C)  HR:  [72-81] 81  Resp:  [17-18] 17  BP: (130-145)/(69-84) 145/80  SpO2:  [94 %-97 %] 97 %  Temp (24hrs), Av 8 °F (36 6 °C), Min:97 5 °F (36 4 °C), Max:98 2 °F (36 8 °C)  Current: Temperature: 97 5 °F (36 4 °C)    Intake/Output Summary (Last 24 hours) at 2021 1457  Last data filed at 2021 0900  Gross per 24 hour   Intake 2000 ml   Output --   Net 2000 ml       General Appearance:  Appearing well, nontoxic, and in no distress   Head:  Left facial swelling and tenderness, no drainage or open wounds  Scab on left cheek   Eyes:  Conjunctiva pink and sclera anicteric, both eyes   Nose: Nares normal, mucosa normal, no drainage   Mouth: Edentulous   Neck: Supple, symmetrical, no adenopathy, no tenderness/mass/nodules   Back:   Symmetric, no curvature, ROM normal, no CVA tenderness   Lungs:   Clear to auscultation bilaterally, respirations unlabored   Chest Wall:  No tenderness or deformity   Heart:  RRR; no murmur, rub or gallop   Abdomen:   Soft, non-tender, non-distended, positive bowel sounds    Extremities: No cyanosis, clubbing or edema   Skin: No rashes or lesions   Lymph nodes: Cervical, supraclavicular nodes normal   Neurologic: Alert and oriented times 3, extremity strength 5/5 and symmetric       LABS, IMAGING, & OTHER STUDIES:  Lab Results:  I have personally reviewed pertinent labs  Results from last 7 days   Lab Units 12/28/21  0551 12/25/21  1546   WBC Thousand/uL 5 76 9 21   HEMOGLOBIN g/dL 13 5 13 2   PLATELETS Thousands/uL 197 248     Results from last 7 days   Lab Units 12/28/21  0552 12/25/21  1546 12/25/21  1546   SODIUM mmol/L 137  --  132*   POTASSIUM mmol/L 3 1*   < > 3 7   CHLORIDE mmol/L 101   < > 94*   CO2 mmol/L 28   < > 29   BUN mg/dL 9   < > 20   CREATININE mg/dL 0 61   < > 1 13*   EGFR ml/min/1 73sq m 103   < > 55   CALCIUM mg/dL 8 6   < > 9 0   AST U/L 23  --  24   ALT U/L 19   < > 13   ALK PHOS U/L 43*   < > 46 2    < > = values in this interval not displayed  Imaging Studies:   I have personally reviewed pertinent imaging study reports and images in PACS  CT neck 12/25/21: soft tissue swelling along left face and mandibular region with associated abscess    Other Studies:   I have personally reviewed pertinent reports

## 2021-12-28 NOTE — PROGRESS NOTES
Vancomycin Assessment    Franck Brown is a 49 y o  female who is currently receiving vancomycin vancomycin 750 mg IV q 12 h for skin-soft tissue infection     Relevant clinical data and objective history reviewed:  Creatinine   Date Value Ref Range Status   12/28/2021 0 61 0 60 - 1 30 mg/dL Final     Comment:     Standardized to IDMS reference method   12/25/2021 1 13 (H) 0 40 - 1 10 mg/dL Final     Comment:     Standardized to IDMS reference method   03/08/2019 0 89 0 60 - 1 30 mg/dL Final     Comment:     Standardized to IDMS reference method     /84   Pulse 78   Temp 98 2 °F (36 8 °C)   Resp 18   SpO2 94%   I/O last 3 completed shifts: In: 2000 [I V :2000]  Out: -   Lab Results   Component Value Date/Time    BUN 9 12/28/2021 05:52 AM    WBC 5 76 12/28/2021 05:51 AM    HGB 13 5 12/28/2021 05:51 AM    HCT 39 4 12/28/2021 05:51 AM    MCV 89 12/28/2021 05:51 AM     12/28/2021 05:51 AM     Temp Readings from Last 3 Encounters:   12/28/21 98 2 °F (36 8 °C)   12/27/21 98 5 °F (36 9 °C) (Oral)   12/15/19 (!) 96 °F (35 6 °C) (Tympanic)     Vancomycin Days of Therapy: 1    Assessment/Plan  The patient is currently on vancomycin utilizing scheduled dosing based on actual body weight  Baseline risks associated with therapy include: pre-existing renal impairment  The patient is currently receiving vancomycin 750 mg IV q 12 h and is clinically appropriate and dose will be continued  Pharmacy will also follow closely for s/sx of nephrotoxicity, infusion reactions, and appropriateness of therapy  BMP and CBC will be ordered per protocol  Plan for trough as patient approaches steady state, prior to the 4th  dose at approximately 0300 on 12/30/2021  Due to infection severity, will target a trough of 10-15 (mild infection/cellulitis)   Pharmacy will continue to follow the patients culture results and clinical progress daily      Stalni Willis Pharmacist

## 2021-12-28 NOTE — H&P
[address] [facility]  H&P- Berlin Delatorre 1965, 49 y o  female MRN: [id number]  Unit/Bed#: [facility] 628-01 Encounter: [id number]  Primary Care Provider: Thomas Olivo MD   Date and time admitted to hospital: 2021  6:42 PM    * Facial abscess  Assessment & Plan  Facial/mandibular abscess   IV Unysyn   Case discussed by ER provider at [facility] with OMFS   Transferred for operative intervention with OMFS  NPO after midnight  Analgesics  Supportive care     COVID-19  Assessment & Plan  Noted to be COVID - 19 positive  Asymptomatic   Monitor oxygen sats, temps, clinically   Supportive care     Hyponatremia  Assessment & Plan  Likely due to dehydration  IV fluids  Monitor closely    Chronic, continuous use of opioids  Assessment & Plan  Patient reports she is on prescription methadone 40 mg daily  She is presently unable to give details of the clinic or the provider  This will need to be confirmed tomorrow        VTE Pharmacologic Prophylaxis: VTE Score: 1 Low Risk (Score 0-2) - Encourage Ambulation  Code Status: Level 1 - Full Code   Discussion with family: Discussed with the patient, reports he is keeping her family updated  Anticipated Length of Stay: Patient will be admitted on an inpatient basis with an anticipated length of stay of greater than 2 midnights secondary to Facial cellulitis/abscess for IV antibiotics, possible operative intervention with OMFS  Chief Complaint:     Facial swelling pain  Transfer from [facility] for OMFS evaluation and management    History of Present Illness:  Berlin Delatorre is a 49 y o  female with a PMH of reports history of cervical fusion, chronic opioid use with methadone 40 mg for prior history of OxyContin use    She reports her physician was providing her OxyContin  recently and she is following with a clinic in [place], she is however unable to provide any details at the time of history taking  Patient is transfer from [facility] ED for management of facial cellulitis/abscess  Patient presented with facial swelling pain fevers  She tried Tylenol and ibuprofen with minimal relief  She presented to [facility] ED  Workup revealed facial cellulitis/mandibular abscess  ER provider discussed with OMFS, patient was placed on IV antibiotics and transferred today for OMFS evaluation possible operative intervention  A routine COVID test was checked at [facility] ED and was reported to be positive  She reports taking Moderna vaccinations for COVID-19 infection including the booster sometime in August   She denies shortness of breath cough  She reports feeling tired  Denies cough chest tightness wheezing  Denies chest pain shortness breath palpitations presyncope syncope  Denies abdominal pain nausea vomiting or diarrhea  Denies urinary symptoms  She has severe pain in the face, requesting analgesics  History reviewed  History chart labs medications reviewed    Review of Systems:  Review of Systems    Review of systems are inquired and of as mentioned above others are negative      Past Medical and Surgical History:   Past Medical History:   Diagnosis Date    Chronic pain     neck       Past Surgical History:   Procedure Laterality Date    CERVICAL FUSION      NECK SURGERY         Meds/Allergies:  Prior to Admission medications    Medication Sig Start Date End Date Taking? Authorizing Provider   Doxylamine Succinate, Sleep, (UNISOM PO) Take by mouth    Historical Provider, MD   Multiple Vitamin (MULTIVITAMIN) capsule Take 1 capsule by mouth daily      Historical Provider, MD   naproxen (NAPROSYN) 500 mg tablet Take 1 tablet (500 mg total) by mouth 2 (two) times a day with meals for 5 days 12/6/19 12/11/19  Archana Chowdhury DO   oxyCODONE-acetaminophen (PERCOCET) 5-325 mg per tablet Take 1 tablet by mouth 3 (three) times a day    Historical Provider, MD     I have reviewed home medications with patient personally  Allergies: No Known Allergies    Social History:  Marital Status: Single   Occupation:   Patient Pre-hospital Living Situation: Home  Patient Pre-hospital Level of Mobility: walks  Patient Pre-hospital Diet Restrictions: no  Substance Use History:   Social History     Substance and Sexual Activity   Alcohol Use No     Social History     Tobacco Use   Smoking Status Current Every Day Smoker    Packs/day: 0 25    Types: Cigarettes   Smokeless Tobacco Never Used     Social History     Substance and Sexual Activity   Drug Use No       Family History:  No family history on file      Physical Exam:     Vitals:   Blood Pressure: 130/69 (12/27/21 1848)  Pulse: 72 (12/27/21 1848)  Temperature: 97 8 °F (36 6 °C) (12/27/21 1848)  Respirations: 18 (12/27/21 1848)  SpO2: 95 % (12/27/21 1848)    Physical Exam     Comfortably sitting up in bed  Facial swelling tenderness noted  Features of cellulitis noted  Lungs diminished breath sounds at the bases  No additional sounds  Heart sounds S1-S2 noted  Abdomen soft nontender  Awake obeys simple commands  No pedal edema  No rash      Additional Data:     Lab Results:  Results from last 7 days   Lab Units 12/25/21  1546   WBC Thousand/uL 9 21   HEMOGLOBIN g/dL 13 2   HEMATOCRIT % 38 6   PLATELETS Thousands/uL 248   NEUTROS PCT % 73   LYMPHS PCT % 13*   MONOS PCT % 14*   EOS PCT % 0     Results from last 7 days   Lab Units 12/25/21  1546   SODIUM mmol/L 132*   POTASSIUM mmol/L 3 7   CHLORIDE mmol/L 94*   CO2 mmol/L 29   BUN mg/dL 20   CREATININE mg/dL 1 13*   ANION GAP mmol/L 9   CALCIUM mg/dL 9 0   ALBUMIN g/dL 4 1   TOTAL BILIRUBIN mg/dL 0 40   ALK PHOS U/L 46 2   ALT U/L 13   AST U/L 24   GLUCOSE RANDOM mg/dL 100                 Results from last 7 days   Lab Units 12/25/21  1546   LACTIC ACID mmol/L 1 0       Imaging: Reviewed radiology reports from this admission including: CT soft tissue neck      ** Please Note: This note has been constructed using a voice recognition system   **

## 2021-12-29 PROBLEM — E87.6 HYPOKALEMIA: Status: ACTIVE | Noted: 2021-12-29

## 2021-12-29 LAB
ANION GAP SERPL CALCULATED.3IONS-SCNC: 6 MMOL/L (ref 4–13)
BASOPHILS # BLD AUTO: 0.01 THOUSANDS/ΜL (ref 0–0.1)
BASOPHILS NFR BLD AUTO: 0 % (ref 0–1)
BUN SERPL-MCNC: 5 MG/DL (ref 5–25)
CALCIUM SERPL-MCNC: 8.8 MG/DL (ref 8.3–10.1)
CHLORIDE SERPL-SCNC: 104 MMOL/L (ref 100–108)
CO2 SERPL-SCNC: 28 MMOL/L (ref 21–32)
CREAT SERPL-MCNC: 0.61 MG/DL (ref 0.6–1.3)
EOSINOPHIL # BLD AUTO: 0.04 THOUSAND/ΜL (ref 0–0.61)
EOSINOPHIL NFR BLD AUTO: 1 % (ref 0–6)
ERYTHROCYTE [DISTWIDTH] IN BLOOD BY AUTOMATED COUNT: 12.6 % (ref 11.6–15.1)
GFR SERPL CREATININE-BSD FRML MDRD: 103 ML/MIN/1.73SQ M
GLUCOSE SERPL-MCNC: 98 MG/DL (ref 65–140)
HCT VFR BLD AUTO: 39.6 % (ref 34.8–46.1)
HGB BLD-MCNC: 13.3 G/DL (ref 11.5–15.4)
IMM GRANULOCYTES # BLD AUTO: 0.04 THOUSAND/UL (ref 0–0.2)
IMM GRANULOCYTES NFR BLD AUTO: 1 % (ref 0–2)
LYMPHOCYTES # BLD AUTO: 1.65 THOUSANDS/ΜL (ref 0.6–4.47)
LYMPHOCYTES NFR BLD AUTO: 27 % (ref 14–44)
MCH RBC QN AUTO: 30.3 PG (ref 26.8–34.3)
MCHC RBC AUTO-ENTMCNC: 33.6 G/DL (ref 31.4–37.4)
MCV RBC AUTO: 90 FL (ref 82–98)
MONOCYTES # BLD AUTO: 0.46 THOUSAND/ΜL (ref 0.17–1.22)
MONOCYTES NFR BLD AUTO: 8 % (ref 4–12)
NEUTROPHILS # BLD AUTO: 3.96 THOUSANDS/ΜL (ref 1.85–7.62)
NEUTS SEG NFR BLD AUTO: 63 % (ref 43–75)
NRBC BLD AUTO-RTO: 0 /100 WBCS
PLATELET # BLD AUTO: 214 THOUSANDS/UL (ref 149–390)
PMV BLD AUTO: 10.6 FL (ref 8.9–12.7)
POTASSIUM SERPL-SCNC: 2.9 MMOL/L (ref 3.5–5.3)
RBC # BLD AUTO: 4.39 MILLION/UL (ref 3.81–5.12)
SODIUM SERPL-SCNC: 138 MMOL/L (ref 136–145)
WBC # BLD AUTO: 6.16 THOUSAND/UL (ref 4.31–10.16)

## 2021-12-29 PROCEDURE — 99233 SBSQ HOSP IP/OBS HIGH 50: CPT | Performed by: STUDENT IN AN ORGANIZED HEALTH CARE EDUCATION/TRAINING PROGRAM

## 2021-12-29 PROCEDURE — 99233 SBSQ HOSP IP/OBS HIGH 50: CPT | Performed by: OTOLARYNGOLOGY

## 2021-12-29 PROCEDURE — 99232 SBSQ HOSP IP/OBS MODERATE 35: CPT | Performed by: NURSE PRACTITIONER

## 2021-12-29 PROCEDURE — 87186 SC STD MICRODIL/AGAR DIL: CPT

## 2021-12-29 PROCEDURE — 85025 COMPLETE CBC W/AUTO DIFF WBC: CPT | Performed by: NURSE PRACTITIONER

## 2021-12-29 PROCEDURE — 80048 BASIC METABOLIC PNL TOTAL CA: CPT | Performed by: NURSE PRACTITIONER

## 2021-12-29 PROCEDURE — 87070 CULTURE OTHR SPECIMN AEROBIC: CPT

## 2021-12-29 PROCEDURE — 87205 SMEAR GRAM STAIN: CPT

## 2021-12-29 PROCEDURE — 10060 I&D ABSCESS SIMPLE/SINGLE: CPT | Performed by: OTOLARYNGOLOGY

## 2021-12-29 PROCEDURE — 0J910ZZ DRAINAGE OF FACE SUBCUTANEOUS TISSUE AND FASCIA, OPEN APPROACH: ICD-10-PCS | Performed by: OTOLARYNGOLOGY

## 2021-12-29 RX ORDER — POTASSIUM CHLORIDE 14.9 MG/ML
20 INJECTION INTRAVENOUS ONCE
Status: COMPLETED | OUTPATIENT
Start: 2021-12-29 | End: 2021-12-29

## 2021-12-29 RX ORDER — HYDROMORPHONE HCL/PF 1 MG/ML
0.5 SYRINGE (ML) INJECTION ONCE
Status: COMPLETED | OUTPATIENT
Start: 2021-12-29 | End: 2021-12-29

## 2021-12-29 RX ORDER — POTASSIUM CHLORIDE 20 MEQ/1
40 TABLET, EXTENDED RELEASE ORAL ONCE
Status: COMPLETED | OUTPATIENT
Start: 2021-12-29 | End: 2021-12-29

## 2021-12-29 RX ADMIN — SODIUM CHLORIDE 3 G: 9 INJECTION, SOLUTION INTRAVENOUS at 14:19

## 2021-12-29 RX ADMIN — ACETAMINOPHEN 650 MG: 325 TABLET, FILM COATED ORAL at 12:57

## 2021-12-29 RX ADMIN — DOCUSATE SODIUM 100 MG: 100 CAPSULE ORAL at 17:11

## 2021-12-29 RX ADMIN — OXYCODONE HYDROCHLORIDE 5 MG: 5 TABLET ORAL at 20:15

## 2021-12-29 RX ADMIN — NICOTINE 1 PATCH: 14 PATCH, EXTENDED RELEASE TRANSDERMAL at 09:11

## 2021-12-29 RX ADMIN — POTASSIUM CHLORIDE 40 MEQ: 1500 TABLET, EXTENDED RELEASE ORAL at 10:32

## 2021-12-29 RX ADMIN — SODIUM CHLORIDE 3 G: 9 INJECTION, SOLUTION INTRAVENOUS at 20:07

## 2021-12-29 RX ADMIN — OXYCODONE HYDROCHLORIDE 5 MG: 5 TABLET ORAL at 09:11

## 2021-12-29 RX ADMIN — SODIUM CHLORIDE 3 G: 9 INJECTION, SOLUTION INTRAVENOUS at 01:38

## 2021-12-29 RX ADMIN — SODIUM CHLORIDE 3 G: 9 INJECTION, SOLUTION INTRAVENOUS at 09:11

## 2021-12-29 RX ADMIN — VANCOMYCIN HYDROCHLORIDE 750 MG: 750 INJECTION, SOLUTION INTRAVENOUS at 04:43

## 2021-12-29 RX ADMIN — OXYCODONE HYDROCHLORIDE 5 MG: 5 TABLET ORAL at 01:38

## 2021-12-29 RX ADMIN — Medication 40 MG: at 09:11

## 2021-12-29 RX ADMIN — VANCOMYCIN HYDROCHLORIDE 750 MG: 750 INJECTION, SOLUTION INTRAVENOUS at 17:12

## 2021-12-29 RX ADMIN — HYDROMORPHONE HYDROCHLORIDE 0.5 MG: 1 INJECTION, SOLUTION INTRAMUSCULAR; INTRAVENOUS; SUBCUTANEOUS at 11:23

## 2021-12-29 RX ADMIN — POTASSIUM CHLORIDE 20 MEQ: 14.9 INJECTION, SOLUTION INTRAVENOUS at 10:32

## 2021-12-29 RX ADMIN — OXYCODONE HYDROCHLORIDE 5 MG: 5 TABLET ORAL at 12:57

## 2021-12-29 NOTE — PROGRESS NOTES
Vancomycin IV Pharmacy-to-Dose Consultation    Madison Pitt is a 49 y o  female who is currently receiving Vancomycin IV with management by the Pharmacy Consult service  Assessment/Plan:  The patient was reviewed  Renal function is stable and no signs or symptoms of nephrotoxicity and/or infusion reactions were documented in the chart  Based on todays assessment, continue current vancomycin (day # 2) dosing of 750 mg IV q 12 h, with a plan for trough to be drawn at 0600 on 12/30/2021  We will continue to follow the patients culture results and clinical progress daily      Angela Walden, Pharmacist

## 2021-12-29 NOTE — PROGRESS NOTES
Progress Note - Infectious Disease   Trish Méndez 49 y o  female MRN: [id number]  Unit/Bed#: [facility] 628-01 Encounter: [id number]      Impression/Plan:    1  Left facial abscess  Developed after she attempted to drain a pimple on her face, so concerning for skin rather than dental origin (patient also had all her teeth removed)  Not improving with antibiotic therapy alone  Plan for I&D with ENT today               -for now, continue Unasyn and vancomycin  Vancomycin dosing by pharmacy              -please collect aerobic and anaerobic cultures with I&D              -ENT following              -monitor facial exam     2  COVID-19 infection  Patient asymptomatic, incidentally discovered on admission  She has received 2 doses Moderna vaccine but was not yet eligible for booster                 -no COVID therapies indicated              -complete 10 days isolation     3  Chronic opioid use  On chronic methadone maintenance  UDS positive for cocaine, oxycodone, methadone               -continue chronic methadone     I have discussed the above management plan in detail with the SLIM PA and patient  ID will follow  Antibiotics:  Unasyn D5  Vancomycin D2    Subjective:  Patient has no fever, chills, sweats; no nausea, vomiting, diarrhea; no cough, shortness of breath; no pain  No new symptoms  Objective:  Vitals:  Temp:  [97 5 °F (36 4 °C)-97 9 °F (36 6 °C)] 97 9 °F (36 6 °C)  HR:  [74-81] 80  Resp:  [17-19] 18  BP: (122-145)/(72-80) 122/72  SpO2:  [94 %-97 %] 94 %  Temp (24hrs), Av 7 °F (36 5 °C), Min:97 5 °F (36 4 °C), Max:97 9 °F (36 6 °C)  Current: Temperature: 97 9 °F (36 6 °C)    Physical Exam:   General Appearance:  Alert, interactive, nontoxic, no acute distress  Mouth: Edentulous   Lungs:   Clear to auscultation bilaterally; no wheezes, rhonchi or rales; respirations unlabored   Heart:  RRR; no murmur, rub or gallop   Abdomen:   Soft, non-tender, non-distended, positive bowel sounds       Extremities: No clubbing, cyanosis or edema   Skin: Left facial swelling and tenderness, no drainage or open wounds  Scab on left cheek       Labs: All pertinent labs and imaging studies were personally reviewed  Results from last 7 days   Lab Units 12/29/21  0538 12/28/21  0551 12/25/21  1546   WBC Thousand/uL 6 16 5 76 9 21   HEMOGLOBIN g/dL 13 3 13 5 13 2   PLATELETS Thousands/uL 214 197 248     Results from last 7 days   Lab Units 12/29/21  0538 12/28/21  0552 12/28/21  0552 12/25/21  1546 12/25/21  1546   SODIUM mmol/L 138  --  137  --  132*   POTASSIUM mmol/L 2 9*   < > 3 1*   < > 3 7   CHLORIDE mmol/L 104   < > 101   < > 94*   CO2 mmol/L 28   < > 28   < > 29   BUN mg/dL 5   < > 9   < > 20   CREATININE mg/dL 0 61   < > 0 61   < > 1 13*   EGFR ml/min/1 73sq m 103   < > 103   < > 55   CALCIUM mg/dL 8 8   < > 8 6   < > 9 0   AST U/L  --   --  23  --  24   ALT U/L  --   --  19   < > 13   ALK PHOS U/L  --   --  43*   < > 46 2    < > = values in this interval not displayed         Imaging:  I have personally reviewed pertinent imaging study reports and images in PACS      CT neck 12/25/21: soft tissue swelling along left face and mandibular region with associated abscess

## 2021-12-29 NOTE — ASSESSMENT & PLAN NOTE
· Patient with history of substance abuse maintained on methadone  Verified with [address] [place] [address] patient takes 40 mg daily  · Of note UDS positive for oxycodone, cocaine, opiates

## 2021-12-29 NOTE — CASE MANAGEMENT
Case Management Assessment & Discharge Planning Note    Patient name Salinas Campbell  Location [facility] 628/[facility] 605-99 MRN [id number]  : 1965 Date 2021       Current Admission Date: 2021  Current Admission Diagnosis:Facial abscess   Patient Active Problem List    Diagnosis Date Noted    Hypokalemia 2021    Mandibular abscess 2021    COVID-19 2021    Facial abscess 2021    Chronic, continuous use of opioids 2021    Hyponatremia 2021      LOS (days): 2  Geometric Mean LOS (GMLOS) (days): 4 60  Days to GMLOS:2 8     OBJECTIVE:    Risk of Unplanned Readmission Score: 10         Current admission status: Inpatient       Preferred Pharmacy:   [address] [place] [address]  No address on file      [facility] [address] [facility] T[facility], [facility] ([address])  [address] [address] [place] [address] [address] ([address])  [place] [address]  Phone: [phone] Fax: [phone]    Primary Care Provider: José Miguel Sheikh MD    Primary Insurance: MEDICARE  Secondary Insurance: [address] [facility]    ASSESSMENT:  [address] [place] [address] 13 [facility], [address] [place] [address] Representative - Son   Primary Phone: [phone] (Mobile)               Advance Directives  Does patient have a [address]?: No  Does patient currently have a [facility] decision maker?: No  Does patient have Advance Directives?: No  Primary Contact: Brie Ruggiero       Patient Information  Admitted from[de-identified] Home  Mental Status: Alert  During Assessment patient was accompanied by: Not accompanied during assessment  Assessment information provided by[de-identified] Patient  Primary Caregiver: Self  Support Systems: Self,Son  Type of Current Residence: Apartment  Floor Level: 6 (elevator access)  Upon entering residence, is there a bedroom on the main floor (no further steps)?: Yes  Upon entering residence, is there a bathroom on the main floor (no further steps)?: Yes  Living Arrangements: Lives Alone  Is patient a ?: No    Activities of Daily Living Prior to Admission  Functional Status: Independent  Completes ADLs independently?: Yes  Ambulates independently?: Yes  Does patient use assisted devices?: No  Does patient currently own DME?: No  Does patient have a history of Outpatient Therapy (PT/OT)?: No  Does the patient have a history of Short-Term Rehab?: Yes (States a rehab in [address] [place] [address], unsure of name)  Does patient have a history of [address]?: No  Does patient currently have [address]?: No      Patient Information Continued  Income Source: SSI/SSD  Does patient have prescription coverage?: Yes  Food insecurity resource given?: N/A  Does patient receive dialysis treatments?: No  Does patient have a history of substance abuse?: No  Does patient have a history of Mental Health Diagnosis?: No      Means of Transportation  Means of Transport to Hospitals in [place][de-identified] [facility]  Was application for public transport provided?: N/A        DISCHARGE DETAILS:    Discharge planning discussed with[de-identified] Patient  Freedom of Choice: Yes        Contacts  Patient Contacts: Linda Mckeon  Relationship to Patient[de-identified] Family  Contact Method: Phone  Phone Number: [phone]  Reason/Outcome: Continuity of [address]          Is the patient interested in [address] at discharge?: No    DME Referral Provided  Referral made for DME?: No     CM reviewed d/c planning process including the following: identifying help at home, patient preference for d/c planning needs, Discharge Lounge, [facility] Meds to Bed program, availability of treatment team to discuss questions or concerns patient and/or family may have regarding understanding medications and recognizing signs and symptoms once discharged  CM also encouraged patient to follow up with all recommended appointments after discharge   Patient advised of importance for patient and family to participate in managing patients medical well being      Patient had 2 moderna vaccines, 2nd in August

## 2021-12-29 NOTE — PROGRESS NOTES
[facility]  Progress Note - Светлана Knight 1965, 49 y o  female MRN: [id number]  [facility] Encounter: [id number]  Primary Care Provider: Jose Mosquera MD   Date and time admitted to hospital: 12/27/2021  6:42 PM    * Facial abscess  Assessment & Plan  Patient presented [facility] with left cheek/lip abscess with significant pain extending up to eye  Transferred to [place] for OMFS evaluation  · CT neck shows soft tissue swelling along the left face  Continue Unasyn  Consult ID  · Seen by OMFS, not odontogenic in origin  · ENT following, planning for I and D today  ENT to obtain cultures  Currently NPO  · Intravenous antibiotics with Unasyn and vancomycin  Id following  · Analgesia with p r n  Oxycodone intravenous Dilaudid for breakthrough  COVID-19  Assessment & Plan  · Noted to be COVID - 19 positive 12/25  · Asymptomatic   · No treatment at this time  · Isolation through 1/4    Hypokalemia  Assessment & Plan  · Replete  BMP and Mag in a [facility] Chronic, continuous use of opioids  Assessment & Plan  · Patient with history of substance abuse maintained on methadone  Verified with [address] [place] [address] patient takes 40 mg daily  · Of note UDS positive for oxycodone, cocaine, opiates  Hyponatremia  Assessment & Plan  · Resolved      VTE Pharmacologic Prophylaxis: VTE Score: 1 Low Risk (Score 0-2) - Encourage Ambulation  Patient Centered Rounds: I performed bedside rounds with nursing staff today  Discussions with Specialists or Other Care Team Provider: nursing, case management     Education and Discussions with Family / Patient: Patient declined call to   Time Spent for Care: 30 minutes  More than 50% of total time spent on counseling and coordination of care as described above      Current Length of Stay: 2 day(s)  Current Patient Status: Inpatient   Certification Statement: The patient will continue to require additional inpatient hospital stay due to IV abx, I& D today, await cultures  Discharge Plan: Anticipate discharge in 48-72 hrs to home  Code Status: Level 1 - Full Code    Subjective:   Patient complains of worsening facial swelling and drainage from left oral commissure  Denies fevers, chills  Significant induration and tenderness noted  Objective:     Vitals:   Temp (24hrs), Av 7 °F (36 5 °C), Min:97 5 °F (36 4 °C), Max:97 9 °F (36 6 °C)    Temp:  [97 5 °F (36 4 °C)-97 9 °F (36 6 °C)] 97 9 °F (36 6 °C)  HR:  [74-81] 80  Resp:  [17-19] 18  BP: (122-145)/(72-80) 122/72  SpO2:  [94 %-97 %] 94 %  There is no height or weight on file to calculate BMI  Input and Output Summary (last 24 hours): Intake/Output Summary (Last 24 hours) at 2021 1038  Last data filed at 2021 0911  Gross per 24 hour   Intake 1160 ml   Output --   Net 1160 ml       Physical Exam:   Physical Exam  Vitals and nursing note reviewed  HENT:      Head:      Comments: Significant left facial swelling and induration  Purulent Drainage noted from left oral commissure  Cardiovascular:      Rate and Rhythm: Normal rate  Pulmonary:      Breath sounds: Normal breath sounds  Abdominal:      Tenderness: There is no abdominal tenderness  Musculoskeletal:         General: No swelling  Skin:     General: Skin is warm  Neurological:      Mental Status: She is alert and oriented to person, place, and time  Mental status is at baseline     Psychiatric:         Mood and Affect: Mood normal           Additional Data:     Labs:  Results from last 7 days   Lab Units 21  0538   WBC Thousand/uL 6 16   HEMOGLOBIN g/dL 13 3   HEMATOCRIT % 39 6   PLATELETS Thousands/uL 214   NEUTROS PCT % 63   LYMPHS PCT % 27   MONOS PCT % 8   EOS PCT % 1     Results from last 7 days   Lab Units 21  0538 21  0552 21  0552   SODIUM mmol/L 138   < > 137   POTASSIUM mmol/L 2 9*   < > 3 1*   CHLORIDE mmol/L 104   < > 101   CO2 mmol/L 28   < > 28   BUN mg/dL 5   < > 9   CREATININE mg/dL 0 61   < > 0 61   ANION GAP mmol/L 6   < > 8   CALCIUM mg/dL 8 8   < > 8 6   ALBUMIN g/dL  --   --  2 8*   TOTAL BILIRUBIN mg/dL  --   --  0 49   ALK PHOS U/L  --   --  43*   ALT U/L  --   --  19   AST U/L  --   --  23   GLUCOSE RANDOM mg/dL 98   < > 87    < > = values in this interval not displayed  Results from last 7 days   Lab Units 12/25/21  1546   LACTIC ACID mmol/L 1 0       Lines/Drains:  Invasive Devices  Report    Peripheral Intravenous Line            Peripheral IV 12/28/21 Right Antecubital 1 day                      Imaging: No pertinent imaging reviewed      Recent Cultures (last 7 days):         Last 24 Hours Medication List:   Current Facility-Administered Medications   Medication Dose Route Frequency Provider Last Rate    acetaminophen  650 mg Oral Q6H PRN Ksenia Chin MD      aluminum-magnesium hydroxide-simethicone  30 mL Oral Q6H PRN Ksenia Chin MD      ampicillin-sulbactam  3 g Intravenous Q6H Ksenia Chin MD 3 g (12/29/21 0911)    docusate sodium  100 mg Oral BID Ksenia Chin MD      HYDROmorphone  0 2 mg Intravenous Q6H PRN FANG Altamirano      methadone  40 mg Oral Daily FANG Altamirano      multivitamin-minerals  1 tablet Oral Daily Ksenia Chin MD      nicotine  1 patch Transdermal Daily Ksenia Chin MD      ondansetron  4 mg Intravenous Q6H PRN Ksenia Chin MD      oxyCODONE  2 5 mg Oral Q4H PRN FANG Altamirano      oxyCODONE  5 mg Oral Q4H PRN FANG Schreiber      polyethylene glycol  17 g Oral Daily Ksenia Chin MD      potassium chloride  20 mEq Intravenous Once FANG Gonzalez 20 mEq (12/29/21 1032)    vancomycin  12 5 mg/kg Intravenous Q12H FANG Schreiber 750 mg (12/29/21 9163)        Today, Patient Was Seen By: FANG Gonzalez    **Please Note: This note may have been constructed using a voice recognition system  **

## 2021-12-29 NOTE — PLAN OF CARE
Problem: PAIN - ADULT  Goal: Verbalizes/displays adequate comfort level or baseline comfort level  Description: Interventions:  - Encourage patient to monitor pain and request assistance  - Assess pain using appropriate pain scale  - Administer analgesics based on type and severity of pain and evaluate response  - Implement non-pharmacological measures as appropriate and evaluate response  - Consider cultural and social influences on pain and pain management  - Notify physician/advanced practitioner if interventions unsuccessful or patient reports new pain  Outcome: Progressing     Problem: INFECTION - ADULT  Goal: Absence or prevention of progression during hospitalization  Description: INTERVENTIONS:  - Assess and monitor for signs and symptoms of infection  - Monitor lab/diagnostic results  - Monitor all insertion sites, i e  indwelling lines, tubes, and drains  - [place] appropriate cooling/warming therapies per order  - Administer medications as ordered  - Instruct and encourage patient and family to use good hand hygiene technique  - Identify and instruct in appropriate isolation precautions for identified infection/condition  Outcome: Progressing  Goal: Absence of fever/infection during neutropenic period  Description: INTERVENTIONS:  - Monitor WBC    Outcome: Progressing     Problem: SAFETY ADULT  Goal: Patient will remain free of falls  Description: INTERVENTIONS:  - Educate patient/family on patient safety including physical limitations  - Instruct patient to call for assistance with activity   - Consult OT/PT to assist with strengthening/mobility   - Keep Call bell within reach  - Keep bed low and locked with side rails adjusted as appropriate  - Keep care items and personal belongings within reach  - Initiate and maintain comfort rounds  - Make Fall Risk Sign visible to staff  - Offer Toileting every 2 Hours, in advance of need  - Initiate/Maintain alarm  - Obtain necessary fall risk management equipment:  - Apply yellow socks and bracelet for high fall risk patients  - Consider moving patient to room near nurses station  Outcome: Progressing  Goal: Maintain or return to baseline ADL function  Description: INTERVENTIONS:  -  Assess patient's ability to carry out ADLs; assess patient's baseline for ADL function and identify physical deficits which impact ability to perform ADLs (bathing, care of mouth/teeth, toileting, grooming, dressing, etc )  - Assess/evaluate cause of self-care deficits   - Assess range of motion  - Assess patient's mobility; develop plan if impaired  - Assess patient's need for assistive devices and provide as appropriate  - Encourage maximum independence but intervene and supervise when necessary  - Involve family in performance of ADLs  - Assess for home care needs following discharge   - Consider OT consult to assist with ADL evaluation and planning for discharge  - Provide patient education as appropriate  Outcome: Progressing  Goal: Maintains/Returns to pre admission functional level  Description: INTERVENTIONS:  - Perform BMAT or MOVE assessment daily    - Set and communicate daily mobility goal to care team and patient/family/caregiver     - Collaborate with rehabilitation services on mobility goals if consulted  - Ambulate patient   - Out of bed to chair   - Out of bed for meals   - Out of bed for toileting  - Record patient progress and toleration of activity level   Outcome: Progressing     Problem: DISCHARGE PLANNING  Goal: Discharge to home or other facility with appropriate resources  Description: INTERVENTIONS:  - Identify barriers to discharge w/patient and caregiver  - Arrange for needed discharge resources and transportation as appropriate  - Identify discharge learning needs (meds, wound care, etc )  - Arrange for interpretive services to assist at discharge as needed  - Refer to Case Management Department for coordinating discharge planning if the patient needs post-hospital services based on physician/advanced practitioner order or complex needs related to functional status, cognitive ability, or social support system  Outcome: Progressing     Problem: Knowledge Deficit  Goal: Patient/family/caregiver demonstrates understanding of disease process, treatment plan, medications, and discharge instructions  Description: Complete learning assessment and assess knowledge base    Interventions:  - Provide teaching at level of understanding  - Provide teaching via preferred learning methods  Outcome: Progressing     Problem: RESPIRATORY - ADULT  Goal: Achieves optimal ventilation and oxygenation  Description: INTERVENTIONS:  - Assess for changes in respiratory status  - Assess for changes in mentation and behavior  - Position to facilitate oxygenation and minimize respiratory effort  - Oxygen administered by appropriate delivery if ordered  - Initiate smoking cessation education as indicated  - Encourage broncho-pulmonary hygiene including cough, deep breathe, Incentive Spirometry  - Assess the need for suctioning and aspirate as needed  - Assess and instruct to report SOB or any respiratory difficulty  - Respiratory Therapy support as indicated  Outcome: Progressing     Problem: SKIN/TISSUE INTEGRITY - ADULT  Goal: Skin Integrity remains intact(Skin Breakdown Prevention)  Description: Assess:  -Perform Ravi assessment  -Clean and moisturize skin   -Inspect skin when repositioning, toileting, and assisting with ADLS  -Assess under medical devices  -Assess extremities for adequate circulation and sensation     Bed Management:  -Have minimal linens on bed & keep smooth, unwrinkled  -Change linens as needed when moist or perspiring    Toileting:  -Offer bedside commode    Activity:  -Mobilize patient   -Encourage activity and walks on unit  -Encourage or provide ROM exercises   -Turn and reposition patient every 2 Hours  -Use appropriate equipment to lift or move patient in bed    Skin Care:  -Avoid use of baby powder, tape, friction and shearing, hot water or constrictive clothing  -Relieve pressure over bony prominences   -Do not massage red bony areas    Outcome: Progressing  Goal: Incision(s), wounds(s) or drain site(s) healing without S/S of infection  Description: INTERVENTIONS  - Assess and document dressing, incision, wound bed, drain sites and surrounding tissue  - Provide patient and family education  - Perform skin care/dressing changes  Outcome: Progressing  Goal: Pressure injury heals and does not worsen  Description: Interventions:  - Implement low air loss mattress or specialty surface (Criteria met)  - Apply silicone foam dressing  - Use special pressure reducing interventions   - Apply fecal or urinary incontinence containment device    - Utilize friction reducing device or surface for transfers   - Consider nutrition services referral as needed  Outcome: Progressing

## 2021-12-29 NOTE — PROGRESS NOTES
OTOLARYNGOLOGY PROGRESS NOTE    Date of Service: 12/29/2021 2:49 PM    Overnight Events: Doing about the same overnight  On vanco, allowing bedside I&D today  See procedure note  PHYSICAL EXAM:  Vitals:    12/29/21 0839   BP: 122/72   Pulse: 80   Resp: 18   Temp:    SpO2: 94%        General: No acute distress, AOx4  HEENT: 1 5cm resolving abscess with spontaneous drainage along left oral commiosure see procedure note  Neurology: No focal deficits  Lungs: Breathing easy, unlabored and even on room air  Cardio: RRR, well perfused  Abd: soft, NT, ND      Intake/Output Summary (Last 24 hours) at 12/29/2021 1449  Last data filed at 12/29/2021 3548  Gross per 24 hour   Intake 1060 ml   Output --   Net 1060 ml         Procedure  Bedside I&D of facial abscess:  After obtaining informed consent, patients left cheek was cleaned with betadine and anesthetized with 1% lidocaine with 1:100,000 epinephrine  An 11 blade was then used to make an incision  The abscess cavity was then entered and spread open  Mild purulence mostly resolving phlegmon  A culture was taken  Patient tolerated the procedure well  Patient Active Problem List   Diagnosis    Mandibular abscess    COVID-19    Facial abscess    Chronic, continuous use of opioids    Hyponatremia    Hypokalemia         ASSESSMENT  Patient is a 49 y o  female w/ acute and chronic problems as above, who presents with left facial abscess now s/p bedside I&D  PLAN  - I&D today at beside   Cultures taken  - Continued warm compress and local wound care  - Culture directed Abx, likely MRSA  - No ENT follow up needed    [place] with questions

## 2021-12-29 NOTE — ASSESSMENT & PLAN NOTE
Patient presented [facility] with left cheek/lip abscess with significant pain extending up to eye  Transferred to [facility] for OMFS evaluation  · CT neck shows soft tissue swelling along the left face  Continue Unasyn  Consult ID  · Seen by OMFS, not odontogenic in origin  · ENT following, planning for I and D today  ENT to obtain cultures  Currently NPO  · Intravenous antibiotics with Unasyn and vancomycin  Id following  · Analgesia with p r n  Oxycodone intravenous Dilaudid for breakthrough

## 2021-12-30 PROBLEM — E87.6 HYPOKALEMIA: Status: RESOLVED | Noted: 2021-12-29 | Resolved: 2021-12-30

## 2021-12-30 PROBLEM — E87.1 HYPONATREMIA: Status: RESOLVED | Noted: 2021-12-27 | Resolved: 2021-12-30

## 2021-12-30 LAB
ANION GAP SERPL CALCULATED.3IONS-SCNC: 3 MMOL/L (ref 4–13)
BASOPHILS # BLD AUTO: 0.02 THOUSANDS/ΜL (ref 0–0.1)
BASOPHILS NFR BLD AUTO: 0 % (ref 0–1)
BUN SERPL-MCNC: 6 MG/DL (ref 5–25)
CALCIUM SERPL-MCNC: 8.5 MG/DL (ref 8.3–10.1)
CHLORIDE SERPL-SCNC: 105 MMOL/L (ref 100–108)
CO2 SERPL-SCNC: 30 MMOL/L (ref 21–32)
CREAT SERPL-MCNC: 0.67 MG/DL (ref 0.6–1.3)
EOSINOPHIL # BLD AUTO: 0.06 THOUSAND/ΜL (ref 0–0.61)
EOSINOPHIL NFR BLD AUTO: 1 % (ref 0–6)
ERYTHROCYTE [DISTWIDTH] IN BLOOD BY AUTOMATED COUNT: 12.7 % (ref 11.6–15.1)
GFR SERPL CREATININE-BSD FRML MDRD: 99 ML/MIN/1.73SQ M
GLUCOSE SERPL-MCNC: 94 MG/DL (ref 65–140)
HCT VFR BLD AUTO: 41.9 % (ref 34.8–46.1)
HGB BLD-MCNC: 13.6 G/DL (ref 11.5–15.4)
IMM GRANULOCYTES # BLD AUTO: 0.02 THOUSAND/UL (ref 0–0.2)
IMM GRANULOCYTES NFR BLD AUTO: 0 % (ref 0–2)
LYMPHOCYTES # BLD AUTO: 1.85 THOUSANDS/ΜL (ref 0.6–4.47)
LYMPHOCYTES NFR BLD AUTO: 30 % (ref 14–44)
MAGNESIUM SERPL-MCNC: 2.2 MG/DL (ref 1.6–2.6)
MCH RBC QN AUTO: 30 PG (ref 26.8–34.3)
MCHC RBC AUTO-ENTMCNC: 32.5 G/DL (ref 31.4–37.4)
MCV RBC AUTO: 93 FL (ref 82–98)
MONOCYTES # BLD AUTO: 0.54 THOUSAND/ΜL (ref 0.17–1.22)
MONOCYTES NFR BLD AUTO: 9 % (ref 4–12)
NEUTROPHILS # BLD AUTO: 3.6 THOUSANDS/ΜL (ref 1.85–7.62)
NEUTS SEG NFR BLD AUTO: 60 % (ref 43–75)
NRBC BLD AUTO-RTO: 0 /100 WBCS
PLATELET # BLD AUTO: 224 THOUSANDS/UL (ref 149–390)
PMV BLD AUTO: 10.3 FL (ref 8.9–12.7)
POTASSIUM SERPL-SCNC: 3.8 MMOL/L (ref 3.5–5.3)
RBC # BLD AUTO: 4.53 MILLION/UL (ref 3.81–5.12)
SODIUM SERPL-SCNC: 138 MMOL/L (ref 136–145)
VANCOMYCIN TROUGH SERPL-MCNC: 10.9 UG/ML (ref 10–20)
WBC # BLD AUTO: 6.09 THOUSAND/UL (ref 4.31–10.16)

## 2021-12-30 PROCEDURE — 80202 ASSAY OF VANCOMYCIN: CPT | Performed by: STUDENT IN AN ORGANIZED HEALTH CARE EDUCATION/TRAINING PROGRAM

## 2021-12-30 PROCEDURE — 83735 ASSAY OF MAGNESIUM: CPT | Performed by: NURSE PRACTITIONER

## 2021-12-30 PROCEDURE — 99232 SBSQ HOSP IP/OBS MODERATE 35: CPT | Performed by: INTERNAL MEDICINE

## 2021-12-30 PROCEDURE — 80048 BASIC METABOLIC PNL TOTAL CA: CPT | Performed by: NURSE PRACTITIONER

## 2021-12-30 PROCEDURE — 99233 SBSQ HOSP IP/OBS HIGH 50: CPT | Performed by: STUDENT IN AN ORGANIZED HEALTH CARE EDUCATION/TRAINING PROGRAM

## 2021-12-30 PROCEDURE — 85025 COMPLETE CBC W/AUTO DIFF WBC: CPT | Performed by: NURSE PRACTITIONER

## 2021-12-30 RX ADMIN — HYDROMORPHONE HYDROCHLORIDE 0.2 MG: 0.2 INJECTION, SOLUTION INTRAMUSCULAR; INTRAVENOUS; SUBCUTANEOUS at 18:17

## 2021-12-30 RX ADMIN — VANCOMYCIN HYDROCHLORIDE 750 MG: 750 INJECTION, SOLUTION INTRAVENOUS at 16:57

## 2021-12-30 RX ADMIN — SODIUM CHLORIDE 3 G: 9 INJECTION, SOLUTION INTRAVENOUS at 16:57

## 2021-12-30 RX ADMIN — VANCOMYCIN HYDROCHLORIDE 750 MG: 750 INJECTION, SOLUTION INTRAVENOUS at 04:37

## 2021-12-30 RX ADMIN — SODIUM CHLORIDE 3 G: 9 INJECTION, SOLUTION INTRAVENOUS at 01:13

## 2021-12-30 RX ADMIN — SODIUM CHLORIDE 3 G: 9 INJECTION, SOLUTION INTRAVENOUS at 22:35

## 2021-12-30 RX ADMIN — SODIUM CHLORIDE 3 G: 9 INJECTION, SOLUTION INTRAVENOUS at 10:15

## 2021-12-30 RX ADMIN — Medication 1 TABLET: at 10:15

## 2021-12-30 RX ADMIN — OXYCODONE HYDROCHLORIDE 5 MG: 5 TABLET ORAL at 01:16

## 2021-12-30 RX ADMIN — OXYCODONE HYDROCHLORIDE 5 MG: 5 TABLET ORAL at 10:15

## 2021-12-30 RX ADMIN — DOCUSATE SODIUM 100 MG: 100 CAPSULE ORAL at 10:15

## 2021-12-30 RX ADMIN — OXYCODONE HYDROCHLORIDE 5 MG: 5 TABLET ORAL at 22:35

## 2021-12-30 RX ADMIN — Medication 40 MG: at 10:16

## 2021-12-30 RX ADMIN — NICOTINE 1 PATCH: 14 PATCH, EXTENDED RELEASE TRANSDERMAL at 10:16

## 2021-12-30 RX ADMIN — DOCUSATE SODIUM 100 MG: 100 CAPSULE ORAL at 17:25

## 2021-12-30 RX ADMIN — OXYCODONE HYDROCHLORIDE 5 MG: 5 TABLET ORAL at 16:57

## 2021-12-30 NOTE — PROGRESS NOTES
Progress Note - Infectious Disease   Tony Mora 49 y o  female MRN: [id number]  Unit/Bed#: [facility] 628-01 Encounter: [id number]      Impression/Plan:    1  Left facial abscess  Developed after she attempted to drain a pimple on her face, so concerning for skin rather than dental origin (patient also had all her teeth removed)  Not improving with antibiotic therapy alone  S/p I7D with ENT yesterday  Culture growing staph aureus  -for now, continue Unasyn and vancomycin  Vancomycin dosing by pharmacy              -if cultures return tomorrow and exam is improving, plan to transition to PO antibiotics               -ENT following              -monitor facial exam     2  COVID-19 infection  Patient asymptomatic, incidentally discovered on admission  She has received 2 doses Moderna vaccine but was not yet eligible for booster                 -no COVID therapies indicated              -complete 10 days isolation (1/3)     3  Chronic opioid use  On chronic methadone maintenance  UDS positive for cocaine, oxycodone, methadone               -continue chronic methadone     I have discussed the above management plan in detail with the SLIM PA and patient  ID will follow  Antibiotics:  Unasyn D6  Vancomycin D3    Subjective:  S/p I&D at bedside yesterday  Patient states it was very painful, and she still has a lot of pain in the left cheek  Swelling is marginally improved  She denies fever, chills, chest pain, shortness of breath  Objective:  Vitals:  Temp:  [98 1 °F (36 7 °C)-98 4 °F (36 9 °C)] 98 4 °F (36 9 °C)  HR:  [69-76] 75  Resp:  [16-18] 16  BP: (120-126)/(78-82) 120/82  SpO2:  [95 %-96 %] 95 %  Temp (24hrs), Av 3 °F (36 8 °C), Min:98 1 °F (36 7 °C), Max:98 4 °F (36 9 °C)  Current: Temperature: 98 4 °F (36 9 °C)    Physical Exam:   General Appearance:  Alert, interactive, nontoxic, no acute distress     Mouth: Edentulous   Lungs:   Clear to auscultation bilaterally; no wheezes, rhonchi or rales; respirations unlabored   Heart:  RRR; no murmur, rub or gallop   Abdomen:   Soft, non-tender, non-distended, positive bowel sounds  Extremities: No clubbing, cyanosis or edema   Skin: Left facial swelling and tenderness, drainage of purulent fluid from left corner of her lips  Scab on left cheek       Labs: All pertinent labs and imaging studies were personally reviewed  Results from last 7 days   Lab Units 12/30/21  0438 12/29/21  0538 12/28/21  0551   WBC Thousand/uL 6 09 6 16 5 76   HEMOGLOBIN g/dL 13 6 13 3 13 5   PLATELETS Thousands/uL 224 214 197     Results from last 7 days   Lab Units 12/30/21  0438 12/29/21  0538 12/29/21  0538 12/28/21  0552 12/28/21  0552 12/25/21  1546 12/25/21  1546   SODIUM mmol/L 138  --  138  --  137   < > 132*   POTASSIUM mmol/L 3 8   < > 2 9*   < > 3 1*   < > 3 7   CHLORIDE mmol/L 105   < > 104   < > 101   < > 94*   CO2 mmol/L 30   < > 28   < > 28   < > 29   BUN mg/dL 6   < > 5   < > 9   < > 20   CREATININE mg/dL 0 67   < > 0 61   < > 0 61   < > 1 13*   EGFR ml/min/1 73sq m 99   < > 103   < > 103   < > 55   CALCIUM mg/dL 8 5   < > 8 8   < > 8 6   < > 9 0   AST U/L  --   --   --   --  23  --  24   ALT U/L  --   --   --   --  19   < > 13   ALK PHOS U/L  --   --   --   --  43*   < > 46 2    < > = values in this interval not displayed         Imaging:  I have personally reviewed pertinent imaging study reports and images in PACS      CT neck 12/25/21: soft tissue swelling along left face and mandibular region with associated abscess

## 2021-12-30 NOTE — ASSESSMENT & PLAN NOTE
Patient presented to [facility] with left cheek/lip abscess with significant pain extending up to eye  Transferred to [place] for OMFS evaluation  · CT neck showed soft tissue swelling along the left face  Continue Unasyn and Vancomycin per ID  · Seen by OMFS, not odontogenic in origin  · ENT following, s/p bedside I&D on 12/29   · Cultures pending, thus far without growth   · Recommend supportive care with local wound care, warm compresses   · Analgesia with p r n  Oxycodone intravenous Dilaudid for breakthrough

## 2021-12-30 NOTE — PROGRESS NOTES
Vancomycin IV Pharmacy-to-Dose Consultation    Petey Dennis is a 49 y o  female who is currently receiving Vancomycin IV for a skin/soft tissue infection with management by the Pharmacy Consult service  Assessment/Plan:  The patient was reviewed  Renal function is stable and no signs or symptoms of nephrotoxicity and/or infusion reactions were documented in the chart  Patient has reached steady state with a trough of 10 1 (extrapolated trough of 10 8)  Based on todays assessment, continue current vancomycin (day # 3) dosing of 750 mg IV q12h , with a plan for trough to be drawn at 1630 on 1/4/22  We will continue to follow the patients culture results and clinical progress daily      Harman Cervantes, Pharmacist

## 2021-12-30 NOTE — ASSESSMENT & PLAN NOTE
Noted to be COVID -19 positive 12/25  · Asymptomatic   · No treatment at this time  · Isolation through 1/4

## 2021-12-30 NOTE — PLAN OF CARE
Problem: PAIN - ADULT  Goal: Verbalizes/displays adequate comfort level or baseline comfort level  Description: Interventions:  - Encourage patient to monitor pain and request assistance  - Assess pain using appropriate pain scale  - Administer analgesics based on type and severity of pain and evaluate response  - Implement non-pharmacological measures as appropriate and evaluate response  - Consider cultural and social influences on pain and pain management  - Notify physician/advanced practitioner if interventions unsuccessful or patient reports new pain  Outcome: Progressing     Problem: INFECTION - ADULT  Goal: Absence or prevention of progression during hospitalization  Description: INTERVENTIONS:  - Assess and monitor for signs and symptoms of infection  - Monitor lab/diagnostic results  - Monitor all insertion sites, i e  indwelling lines, tubes, and drains  - Hood appropriate cooling/warming therapies per order  - Administer medications as ordered  - Instruct and encourage patient and family to use good hand hygiene technique  - Identify and instruct in appropriate isolation precautions for identified infection/condition  Outcome: Progressing  Goal: Absence of fever/infection during neutropenic period  Description: INTERVENTIONS:  - Monitor WBC    Outcome: Progressing     Problem: SAFETY ADULT  Goal: Patient will remain free of falls  Description: INTERVENTIONS:  - Educate patient/family on patient safety including physical limitations  - Instruct patient to call for assistance with activity   - Consult OT/PT to assist with strengthening/mobility   - Keep Call bell within reach  - Keep bed low and locked with side rails adjusted as appropriate  - Keep care items and personal belongings within reach  - Initiate and maintain comfort rounds  - Make Fall Risk Sign visible to staff  - Offer Toileting every 2 Hours, in advance of need  - Initiate/Maintain alarm  - Obtain necessary fall risk management equipment:  - Apply yellow socks and bracelet for high fall risk patients  - Consider moving patient to room near nurses station  Outcome: Progressing  Goal: Maintain or return to baseline ADL function  Description: INTERVENTIONS:  -  Assess patient's ability to carry out ADLs; assess patient's baseline for ADL function and identify physical deficits which impact ability to perform ADLs (bathing, care of mouth/teeth, toileting, grooming, dressing, etc )  - Assess/evaluate cause of self-care deficits   - Assess range of motion  - Assess patient's mobility; develop plan if impaired  - Assess patient's need for assistive devices and provide as appropriate  - Encourage maximum independence but intervene and supervise when necessary  - Involve family in performance of ADLs  - Assess for home care needs following discharge   - Consider OT consult to assist with ADL evaluation and planning for discharge  - Provide patient education as appropriate  Outcome: Progressing  Goal: Maintains/Returns to pre admission functional level  Description: INTERVENTIONS:  - Perform BMAT or MOVE assessment daily    - Set and communicate daily mobility goal to care team and patient/family/caregiver     - Collaborate with rehabilitation services on mobility goals if consulted  - Ambulate patient   - Out of bed to chair   - Out of bed for meals   - Out of bed for toileting  - Record patient progress and toleration of activity level   Outcome: Progressing     Problem: DISCHARGE PLANNING  Goal: Discharge to home or other facility with appropriate resources  Description: INTERVENTIONS:  - Identify barriers to discharge w/patient and caregiver  - Arrange for needed discharge resources and transportation as appropriate  - Identify discharge learning needs (meds, wound care, etc )  - Arrange for interpretive services to assist at discharge as needed  - Refer to Case Management Department for coordinating discharge planning if the patient needs post-hospital services based on physician/advanced practitioner order or complex needs related to functional status, cognitive ability, or social support system  Outcome: Progressing     Problem: Knowledge Deficit  Goal: Patient/family/caregiver demonstrates understanding of disease process, treatment plan, medications, and discharge instructions  Description: Complete learning assessment and assess knowledge base    Interventions:  - Provide teaching at level of understanding  - Provide teaching via preferred learning methods  Outcome: Progressing     Problem: RESPIRATORY - ADULT  Goal: Achieves optimal ventilation and oxygenation  Description: INTERVENTIONS:  - Assess for changes in respiratory status  - Assess for changes in mentation and behavior  - Position to facilitate oxygenation and minimize respiratory effort  - Oxygen administered by appropriate delivery if ordered  - Initiate smoking cessation education as indicated  - Encourage broncho-pulmonary hygiene including cough, deep breathe, Incentive Spirometry  - Assess the need for suctioning and aspirate as needed  - Assess and instruct to report SOB or any respiratory difficulty  - Respiratory Therapy support as indicated  Outcome: Progressing     Problem: SKIN/TISSUE INTEGRITY - ADULT  Goal: Skin Integrity remains intact(Skin Breakdown Prevention)  Description: Assess:  -Perform Ravi assessment  -Clean and moisturize skin   -Inspect skin when repositioning, toileting, and assisting with ADLS  -Assess under medical devices  -Assess extremities for adequate circulation and sensation     Bed Management:  -Have minimal linens on bed & keep smooth, unwrinkled  -Change linens as needed when moist or perspiring    Toileting:  -Offer bedside commode    Activity:  -Mobilize patient   -Encourage activity and walks on unit  -Encourage or provide ROM exercises   -Turn and reposition patient every 2 Hours  -Use appropriate equipment to lift or move patient in bed    Skin Care:  -Avoid use of baby powder, tape, friction and shearing, hot water or constrictive clothing  -Relieve pressure over bony prominences   -Do not massage red bony areas    Outcome: Progressing  Goal: Incision(s), wounds(s) or drain site(s) healing without S/S of infection  Description: INTERVENTIONS  - Assess and document dressing, incision, wound bed, drain sites and surrounding tissue  - Provide patient and family education  - Perform skin care/dressing changes   Outcome: Progressing  Goal: Pressure injury heals and does not worsen  Description: Interventions:  - Implement low air loss mattress or specialty surface (Criteria met)  - Apply silicone foam dressing  - Use special pressure reducing interventions   - Apply fecal or urinary incontinence containment device    - Utilize friction reducing device or surface for transfers   - Consider nutrition services referral as needed  Outcome: Progressing

## 2021-12-30 NOTE — ASSESSMENT & PLAN NOTE
Patient presented to [facility] with left cheek/lip abscess with significant pain extending up to eye  Transferred to [facility] for OMFS evaluation  · CT neck showed soft tissue swelling along the left face  · Seen by OMFS, not odontogenic in origin  · ENT following, s/p bedside I&D on 12/29   · Cultures grew MRSA  · Id following, off Unasyn  Continue intravenous vanco with plan to transition to Bactrim on discharge  · Ongoing drainage, although significantly improved  Discussed with ID  Will have ENT re-evaluate, possible additional I&D verses incision to allow for better drainage  · Recommend supportive care with local wound care, warm compresses   · Analgesia with p r n  Oxycodone intravenous Dilaudid for breakthrough

## 2021-12-30 NOTE — ASSESSMENT & PLAN NOTE
Patient with history of substance abuse maintained on methadone  Prior provider verified with [facility] patient takes 40 mg daily  · Of note UDS positive for oxycodone, cocaine, opiates

## 2021-12-30 NOTE — ASSESSMENT & PLAN NOTE
Patient with history of substance abuse maintained on methadone  Prior provider verified with [facility] patient takes 40 mg daily  · Of note UDS positive for oxycodone, cocaine, opiates

## 2021-12-30 NOTE — PROGRESS NOTES
[address] [facility]  Progress Note - Emmanuelle De La Fuente 1965, 49 y o  female MRN: [id number]  Unit/Bed#: [facility] 628-01 Encounter: [id number]  Primary Care Provider: Jair Suárez MD   Date and time admitted to hospital: 12/27/2021  6:42 PM    * Facial abscess  Assessment & Plan  Patient presented to [facility] with left cheek/lip abscess with significant pain extending up to eye  [place] [facility] [place] [place]  · CT neck showed soft tissue swelling along the left face  Continue Unasyn and Vancomycin per ID  · Seen by OMFS, not odontogenic in origin  · ENT following, s/p bedside I&D on 12/29   · Cultures pending, thus far with stap  · Recommend supportive care with local wound care, warm compresses   · Analgesia with p r n  Oxycodone intravenous Dilaudid for breakthrough  COVID-19  Assessment & Plan  Noted to be COVID -19 positive 12/25  · Asymptomatic   · No treatment at this time  · Isolation through 1/4    Chronic, continuous use of opioids  Assessment & Plan  Patient with history of substance abuse maintained on methadone  Prior provider verified with [facility] patient takes 40 mg daily  · Of note UDS positive for oxycodone, cocaine, opiates  Hypokalemia-resolved as of 12/30/2021  Assessment & Plan  · Resolved     Hyponatremia-resolved as of 12/30/2021  Assessment & Plan  · Resolved        VTE Pharmacologic Prophylaxis: VTE Score: 1 Low Risk (Score 0-2) - Encourage Ambulation  Patient Centered Rounds: I performed bedside rounds with nursing staff today  Discussions with Specialists or Other Care Team Provider: appreciate ID input, d/w ENT    Education and Discussions with Family / Patient: Patient declined call to   Time Spent for Care: 30 minutes  More than 50% of total time spent on counseling and coordination of care as described above      Current Length of Stay: 3 day(s)  Current Patient Status: Inpatient   Certification Statement: The patient will continue to require additional inpatient hospital stay due to pending final cultures   Discharge Plan: Anticipate discharge later today or tomorrow to home  Code Status: Level 1 - Full Code    Subjective:   Doing okay  Hungry as she was made NPO MN for unclear reason  Clarified with ENT she does not need to be NPO  Having a lot of pus draining from L side inner corner of mouth  Still with a lot of pain  No fevers  No COVID symptoms  Objective:     Vitals:   Temp (24hrs), Av 3 °F (36 8 °C), Min:98 1 °F (36 7 °C), Max:98 4 °F (36 9 °C)    Temp:  [98 1 °F (36 7 °C)-98 4 °F (36 9 °C)] 98 4 °F (36 9 °C)  HR:  [69-76] 75  Resp:  [16-18] 16  BP: (120-126)/(78-82) 120/82  SpO2:  [95 %-96 %] 95 %  There is no height or weight on file to calculate BMI  Input and Output Summary (last 24 hours): Intake/Output Summary (Last 24 hours) at 2021 1229  Last data filed at 2021 0427  Gross per 24 hour   Intake 1210 ml   Output --   Net 1210 ml       Physical Exam:   Physical Exam  Vitals and nursing note reviewed  Constitutional:       General: She is not in acute distress  Comments: Significant L sided facial edema   HENT:      Mouth/Throat:      Pharynx: Oropharyngeal exudate (coming from corner of mouth, L side, some pus expressed upon palpation ) present  Cardiovascular:      Rate and Rhythm: Normal rate and regular rhythm  Pulmonary:      Effort: No respiratory distress  Abdominal:      General: There is no distension  Tenderness: There is no abdominal tenderness  Musculoskeletal:      Right lower leg: No edema  Left lower leg: No edema  Neurological:      Mental Status: She is oriented to person, place, and time     Psychiatric:         Mood and Affect: Mood normal           Additional Data:     Labs:  Results from last 7 days   Lab Units 21  0438   WBC Thousand/uL 6 09   HEMOGLOBIN g/dL 13 6   HEMATOCRIT % 41 9   PLATELETS Thousands/uL 224 NEUTROS PCT % 60   LYMPHS PCT % 30   MONOS PCT % 9   EOS PCT % 1     Results from last 7 days   Lab Units 12/30/21  0438 12/29/21  0538 12/28/21  0552   SODIUM mmol/L 138   < > 137   POTASSIUM mmol/L 3 8   < > 3 1*   CHLORIDE mmol/L 105   < > 101   CO2 mmol/L 30   < > 28   BUN mg/dL 6   < > 9   CREATININE mg/dL 0 67   < > 0 61   ANION GAP mmol/L 3*   < > 8   CALCIUM mg/dL 8 5   < > 8 6   ALBUMIN g/dL  --   --  2 8*   TOTAL BILIRUBIN mg/dL  --   --  0 49   ALK PHOS U/L  --   --  43*   ALT U/L  --   --  19   AST U/L  --   --  23   GLUCOSE RANDOM mg/dL 94   < > 87    < > = values in this interval not displayed                   Results from last 7 days   Lab Units 12/25/21  1546   LACTIC ACID mmol/L 1 0       Lines/Drains:  Invasive Devices  Report    Peripheral Intravenous Line            Peripheral IV 12/28/21 Right Antecubital 2 days                      Imaging: Reviewed radiology reports from this admission including: CT    Recent Cultures (last 7 days):   Results from last 7 days   Lab Units 12/29/21  1212   GRAM STAIN RESULT  No Polys or Bacteria seen   BODY FLUID CULTURE, STERILE  1+ Growth of Staphylococcus aureus*       Last 24 Hours Medication List:   Current Facility-Administered Medications   Medication Dose Route Frequency Provider Last Rate    acetaminophen  650 mg Oral Q6H PRN Mer Michael MD      aluminum-magnesium hydroxide-simethicone  30 mL Oral Q6H PRN Mer Michael MD      ampicillin-sulbactam  3 g Intravenous Q6H Mer Michael MD 3 g (12/30/21 1015)    docusate sodium  100 mg Oral BID Mer Michael MD      HYDROmorphone  0 2 mg Intravenous Q6H PRN FANG García      methadone  40 mg Oral Daily FANG García      multivitamin-minerals  1 tablet Oral Daily Mer Michael MD      nicotine  1 patch Transdermal Daily Mer Michael MD      ondansetron  4 mg Intravenous Q6H PRN Mer Michael MD      oxyCODONE  2 5 mg Oral Q4H PRN Kaelyn Arreola, CRNP      oxyCODONE  5 mg Oral Q4H PRN FANG Schreiber      polyethylene glycol  17 g Oral Daily Kat Campos MD      vancomycin  12 5 mg/kg Intravenous [place] Craftoforo Bile, CRNP 750 mg (12/30/21 3984)        Today, Patient Was Seen By: Natalie Cruz PA-C    **Please Note: This note may have been constructed using a voice recognition system  **

## 2021-12-31 LAB
ANION GAP SERPL CALCULATED.3IONS-SCNC: 4 MMOL/L (ref 4–13)
BACTERIA SPEC BFLD CULT: ABNORMAL
BASOPHILS # BLD AUTO: 0.01 THOUSANDS/ΜL (ref 0–0.1)
BASOPHILS NFR BLD AUTO: 0 % (ref 0–1)
BUN SERPL-MCNC: 9 MG/DL (ref 5–25)
CALCIUM SERPL-MCNC: 8.8 MG/DL (ref 8.3–10.1)
CHLORIDE SERPL-SCNC: 106 MMOL/L (ref 100–108)
CO2 SERPL-SCNC: 28 MMOL/L (ref 21–32)
CREAT SERPL-MCNC: 0.69 MG/DL (ref 0.6–1.3)
EOSINOPHIL # BLD AUTO: 0.1 THOUSAND/ΜL (ref 0–0.61)
EOSINOPHIL NFR BLD AUTO: 2 % (ref 0–6)
ERYTHROCYTE [DISTWIDTH] IN BLOOD BY AUTOMATED COUNT: 12.7 % (ref 11.6–15.1)
GFR SERPL CREATININE-BSD FRML MDRD: 99 ML/MIN/1.73SQ M
GLUCOSE SERPL-MCNC: 99 MG/DL (ref 65–140)
GRAM STN SPEC: ABNORMAL
HCT VFR BLD AUTO: 42.9 % (ref 34.8–46.1)
HGB BLD-MCNC: 14.1 G/DL (ref 11.5–15.4)
IMM GRANULOCYTES # BLD AUTO: 0.03 THOUSAND/UL (ref 0–0.2)
IMM GRANULOCYTES NFR BLD AUTO: 1 % (ref 0–2)
LYMPHOCYTES # BLD AUTO: 1.92 THOUSANDS/ΜL (ref 0.6–4.47)
LYMPHOCYTES NFR BLD AUTO: 43 % (ref 14–44)
MCH RBC QN AUTO: 30.1 PG (ref 26.8–34.3)
MCHC RBC AUTO-ENTMCNC: 32.9 G/DL (ref 31.4–37.4)
MCV RBC AUTO: 92 FL (ref 82–98)
MONOCYTES # BLD AUTO: 0.44 THOUSAND/ΜL (ref 0.17–1.22)
MONOCYTES NFR BLD AUTO: 10 % (ref 4–12)
NEUTROPHILS # BLD AUTO: 1.95 THOUSANDS/ΜL (ref 1.85–7.62)
NEUTS SEG NFR BLD AUTO: 44 % (ref 43–75)
NRBC BLD AUTO-RTO: 0 /100 WBCS
PLATELET # BLD AUTO: 263 THOUSANDS/UL (ref 149–390)
PMV BLD AUTO: 10.3 FL (ref 8.9–12.7)
POTASSIUM SERPL-SCNC: 3.8 MMOL/L (ref 3.5–5.3)
RBC # BLD AUTO: 4.68 MILLION/UL (ref 3.81–5.12)
SODIUM SERPL-SCNC: 138 MMOL/L (ref 136–145)
WBC # BLD AUTO: 4.45 THOUSAND/UL (ref 4.31–10.16)

## 2021-12-31 PROCEDURE — 85025 COMPLETE CBC W/AUTO DIFF WBC: CPT | Performed by: INTERNAL MEDICINE

## 2021-12-31 PROCEDURE — 99024 POSTOP FOLLOW-UP VISIT: CPT | Performed by: OTOLARYNGOLOGY

## 2021-12-31 PROCEDURE — 80048 BASIC METABOLIC PNL TOTAL CA: CPT | Performed by: INTERNAL MEDICINE

## 2021-12-31 PROCEDURE — 99232 SBSQ HOSP IP/OBS MODERATE 35: CPT | Performed by: NURSE PRACTITIONER

## 2021-12-31 PROCEDURE — 99233 SBSQ HOSP IP/OBS HIGH 50: CPT | Performed by: STUDENT IN AN ORGANIZED HEALTH CARE EDUCATION/TRAINING PROGRAM

## 2021-12-31 RX ORDER — DIAZEPAM 5 MG/ML
2.5 INJECTION, SOLUTION INTRAMUSCULAR; INTRAVENOUS ONCE
Status: DISCONTINUED | OUTPATIENT
Start: 2021-12-31 | End: 2022-01-01 | Stop reason: HOSPADM

## 2021-12-31 RX ORDER — HYDROMORPHONE HCL/PF 1 MG/ML
0.5 SYRINGE (ML) INJECTION ONCE
Status: DISCONTINUED | OUTPATIENT
Start: 2021-12-31 | End: 2022-01-01 | Stop reason: HOSPADM

## 2021-12-31 RX ADMIN — OXYCODONE HYDROCHLORIDE 5 MG: 5 TABLET ORAL at 06:08

## 2021-12-31 RX ADMIN — Medication 40 MG: at 09:31

## 2021-12-31 RX ADMIN — Medication 1 TABLET: at 09:30

## 2021-12-31 RX ADMIN — VANCOMYCIN HYDROCHLORIDE 750 MG: 750 INJECTION, SOLUTION INTRAVENOUS at 06:08

## 2021-12-31 RX ADMIN — SODIUM CHLORIDE 3 G: 9 INJECTION, SOLUTION INTRAVENOUS at 04:54

## 2021-12-31 RX ADMIN — HYDROMORPHONE HYDROCHLORIDE 0.2 MG: 0.2 INJECTION, SOLUTION INTRAMUSCULAR; INTRAVENOUS; SUBCUTANEOUS at 01:39

## 2021-12-31 RX ADMIN — OXYCODONE HYDROCHLORIDE 5 MG: 5 TABLET ORAL at 17:50

## 2021-12-31 RX ADMIN — DOCUSATE SODIUM 100 MG: 100 CAPSULE ORAL at 09:30

## 2021-12-31 RX ADMIN — HYDROMORPHONE HYDROCHLORIDE 0.2 MG: 0.2 INJECTION, SOLUTION INTRAMUSCULAR; INTRAVENOUS; SUBCUTANEOUS at 09:36

## 2021-12-31 RX ADMIN — NICOTINE 1 PATCH: 14 PATCH, EXTENDED RELEASE TRANSDERMAL at 09:30

## 2021-12-31 RX ADMIN — OXYCODONE HYDROCHLORIDE 5 MG: 5 TABLET ORAL at 22:44

## 2021-12-31 RX ADMIN — OXYCODONE HYDROCHLORIDE 5 MG: 5 TABLET ORAL at 11:10

## 2021-12-31 RX ADMIN — DOCUSATE SODIUM 100 MG: 100 CAPSULE ORAL at 17:45

## 2021-12-31 RX ADMIN — VANCOMYCIN HYDROCHLORIDE 750 MG: 750 INJECTION, SOLUTION INTRAVENOUS at 17:45

## 2021-12-31 NOTE — PROGRESS NOTES
Vancomycin IV Pharmacy-to-Dose Consultation    Taran Parker is a 49 y o  female who is currently receiving Vancomycin IV with management by the Pharmacy Consult service  Assessment/Plan:  The patient was reviewed  Renal function is stable and no signs or symptoms of nephrotoxicity and/or infusion reactions were documented in the chart  Based on todays assessment, continue current vancomycin (day # 4) dosing of 750 mg IV Q12, with a plan for trough to be drawn at 1630 on 1/4/22  We will continue to follow the patients culture results and clinical progress daily      Marija Barragan, Pharmacist

## 2021-12-31 NOTE — PROGRESS NOTES
Vancomycin IV Pharmacy-to-Dose Consultation    Nicola Zacarias is a 49 y o  female who is currently receiving Vancomycin IV with management by the Pharmacy Consult service  Assessment/Plan:  The patient was reviewed  Renal function is stable and no signs or symptoms of nephrotoxicity and/or infusion reactions were documented in the chart  Based on todays assessment, continue current vancomycin (day # 4) dosing of 750 mg IV Q12, with a plan for trough to be drawn at 1630 on 1/4/22  We will continue to follow the patients culture results and clinical progress daily      Nelsy Storey, Pharmacist

## 2021-12-31 NOTE — PROGRESS NOTES
[facility] [address] [facility]  Maurice Mobley 49 y o  female MRN: [id number]    Linda Fairchild MD  Office : [phone]    Progress Note:    Subjective:  Patient with history of facial cellulitis and abscess on left buccal area, status post incision and drainage by Dr Claudio Bowman on 12/29/2021  The area of inflammation has significantly decreased  She still has some scant drainage of purulent fluid through a small skin fistula approximately 1 cm lateral to the labial commissure  Patient lives in a residential facility, she is COVID positive, in addition culture from the drainage is growing MRSA  She is currently on vancomycin  Re-evaluation was requested      Physical Exam   Blood pressure 124/80, pulse 77, temperature 98 °F (36 7 °C), resp  rate 18, SpO2 95 %  Constitutional: Oriented to person, place, and time  Well-developed and well-nourished, no apparent distress, non-toxic appearance  Right Ear: External ear normal   Auditory canal clear  Tympanic membrane well-appearing, no fluid present  Left Ear: External ear normal   Auditory canal clear  Tympanic membrane well-appearing, no fluid present  Nose: Septum midline, mucosa moist, turbinates well appearing  No crusting, polyps or discharge evident  Oral cavity:  Edentulous  Mucosa moist, lips normal   Tongue mobile, floor of mouth normal   Hard palate unremarkable  No masses or lesions  Face: There is a area of induration of the buccal area soft tissues with mild erythema approximately 2 cm in diameter with a central skin fistula that drains scant amount of purulent material upon compression of the area  There is no fluctuance  Most of the area of edema as judged from CT scan at admission has resolved  Pulmonary/Chest: Normal effort and rate  No respiratory distress  Musculoskeletal: Normal range of motion  Neurological: Cranial nerves 2-12 intact  Assessment:  1   Mandibular abscess  Inpatient consult to Oral and Maxillofacial Surgery    Inpatient consult to Oral and Maxillofacial Surgery   2  Facial abscess  Inpatient consult to Infectious Diseases    Inpatient consult to Infectious Diseases    Inpatient consult to ENT    Inpatient consult to ENT    CANCELED: Inpatient consult to Plastic Surgery    CANCELED: Inpatient consult to Plastic Surgery     Imaging:  CT scan at admission reveal significant edema with fat stranding, on the left buccal area anterior to the masseter muscle, no clear well-defined abscess was noticed  Patient is edentulous, no evident odontogenic source of problem  Labs: White blood cell count has been within normal range  Last CBC with 4 45 white blood cells    Assessment:  MRSA soft tissue infection with overall improvement  No additional incision and drainage is needed at this point  She needs to continue treatment for her known MRSA infection  F/u in the office as an outpatient in 2 weeks

## 2021-12-31 NOTE — PLAN OF CARE
Problem: PAIN - ADULT  Goal: Verbalizes/displays adequate comfort level or baseline comfort level  Description: Interventions:  - Encourage patient to monitor pain and request assistance  - Assess pain using appropriate pain scale  - Administer analgesics based on type and severity of pain and evaluate response  - Implement non-pharmacological measures as appropriate and evaluate response  - Consider cultural and social influences on pain and pain management  - Notify physician/advanced practitioner if interventions unsuccessful or patient reports new pain  Outcome: Progressing     Problem: INFECTION - ADULT  Goal: Absence or prevention of progression during hospitalization  Description: INTERVENTIONS:  - Assess and monitor for signs and symptoms of infection  - Monitor lab/diagnostic results  - Monitor all insertion sites, i e  indwelling lines, tubes, and drains  - [place] appropriate cooling/warming therapies per order  - Administer medications as ordered  - Instruct and encourage patient and family to use good hand hygiene technique  - Identify and instruct in appropriate isolation precautions for identified infection/condition  Outcome: Progressing  Goal: Absence of fever/infection during neutropenic period  Description: INTERVENTIONS:  - Monitor WBC    Outcome: Progressing     Problem: SAFETY ADULT  Goal: Patient will remain free of falls  Description: INTERVENTIONS:  - Educate patient/family on patient safety including physical limitations  - Instruct patient to call for assistance with activity   - Consult OT/PT to assist with strengthening/mobility   - Keep Call bell within reach  - Keep bed low and locked with side rails adjusted as appropriate  - Keep care items and personal belongings within reach  - Initiate and maintain comfort rounds  - Make Fall Risk Sign visible to staff  - Offer Toileting every 2 Hours, in advance of need  - Initiate/Maintain alarm  - Obtain necessary fall risk management equipment:  - Apply yellow socks and bracelet for high fall risk patients  - Consider moving patient to room near nurses station  Outcome: Progressing  Goal: Maintain or return to baseline ADL function  Description: INTERVENTIONS:  -  Assess patient's ability to carry out ADLs; assess patient's baseline for ADL function and identify physical deficits which impact ability to perform ADLs (bathing, care of mouth/teeth, toileting, grooming, dressing, etc )  - Assess/evaluate cause of self-care deficits   - Assess range of motion  - Assess patient's mobility; develop plan if impaired  - Assess patient's need for assistive devices and provide as appropriate  - Encourage maximum independence but intervene and supervise when necessary  - Involve family in performance of ADLs  - Assess for home care needs following discharge   - Consider OT consult to assist with ADL evaluation and planning for discharge  - Provide patient education as appropriate  Outcome: Progressing  Goal: Maintains/Returns to pre admission functional level  Description: INTERVENTIONS:  - Perform BMAT or MOVE assessment daily    - Set and communicate daily mobility goal to care team and patient/family/caregiver     - Collaborate with rehabilitation services on mobility goals if consulted  - Ambulate patient   - Out of bed to chair   - Out of bed for meals   - Out of bed for toileting  - Record patient progress and toleration of activity level   Outcome: Progressing     Problem: DISCHARGE PLANNING  Goal: Discharge to home or other facility with appropriate resources  Description: INTERVENTIONS:  - Identify barriers to discharge w/patient and caregiver  - Arrange for needed discharge resources and transportation as appropriate  - Identify discharge learning needs (meds, wound care, etc )  - Arrange for interpretive services to assist at discharge as needed  - Refer to Case Management Department for coordinating discharge planning if the patient needs post-hospital services based on physician/advanced practitioner order or complex needs related to functional status, cognitive ability, or social support system  Outcome: Progressing     Problem: Knowledge Deficit  Goal: Patient/family/caregiver demonstrates understanding of disease process, treatment plan, medications, and discharge instructions  Description: Complete learning assessment and assess knowledge base    Interventions:  - Provide teaching at level of understanding  - Provide teaching via preferred learning methods  Outcome: Progressing     Problem: RESPIRATORY - ADULT  Goal: Achieves optimal ventilation and oxygenation  Description: INTERVENTIONS:  - Assess for changes in respiratory status  - Assess for changes in mentation and behavior  - Position to facilitate oxygenation and minimize respiratory effort  - Oxygen administered by appropriate delivery if ordered  - Initiate smoking cessation education as indicated  - Encourage broncho-pulmonary hygiene including cough, deep breathe, Incentive Spirometry  - Assess the need for suctioning and aspirate as needed  - Assess and instruct to report SOB or any respiratory difficulty  - Respiratory Therapy support as indicated  Outcome: Progressing     Problem: SKIN/TISSUE INTEGRITY - ADULT  Goal: Skin Integrity remains intact(Skin Breakdown Prevention)  Description: Assess:  -Perform Ravi assessment  -Clean and moisturize skin   -Inspect skin when repositioning, toileting, and assisting with ADLS  -Assess under medical devices  -Assess extremities for adequate circulation and sensation     Bed Management:  -Have minimal linens on bed & keep smooth, unwrinkled  -Change linens as needed when moist or perspiring    Toileting:  -Offer bedside commode    Activity:  -Mobilize patient   -Encourage activity and walks on unit  -Encourage or provide ROM exercises   -Turn and reposition patient every 2 Hours  -Use appropriate equipment to lift or move patient in bed    Skin Care:  -Avoid use of baby powder, tape, friction and shearing, hot water or constrictive clothing  -Relieve pressure over bony prominences   -Do not massage red bony areas    Outcome: Progressing  Goal: Incision(s), wounds(s) or drain site(s) healing without S/S of infection  Description: INTERVENTIONS  - Assess and document dressing, incision, wound bed, drain sites and surrounding tissue  - Provide patient and family education  - Perform skin care/dressing changes   Outcome: Progressing  Goal: Pressure injury heals and does not worsen  Description: Interventions:  - Implement low air loss mattress or specialty surface (Criteria met)  - Apply silicone foam dressing  - Use special pressure reducing interventions   - Apply fecal or urinary incontinence containment device    - Utilize friction reducing device or surface for transfers   - Consider nutrition services referral as needed  Outcome: Progressing

## 2021-12-31 NOTE — PROGRESS NOTES
[facility]  Progress Note - [place] Delatorre 1965, 49 y o  female MRN: [id number]  Unit/Bed#: [facility] 628-01 Encounter: [id number]  Primary Care Provider: Thomas Olivo MD   Date and time admitted to hospital: 12/27/2021  6:42 PM    * Facial abscess  Assessment & Plan  Patient presented to [facility] with left cheek/lip abscess with significant pain extending up to eye  Transferred to [place] for OMFS evaluation  · CT neck showed soft tissue swelling along the left face  · Seen by OMFS, not odontogenic in origin  · ENT following, s/p bedside I&D on 12/29   · Cultures grew MRSA  · Id following, off Unasyn  Continue intravenous vanco with plan to transition to Bactrim on discharge  · Ongoing drainage, although significantly improved  Discussed with ID  Will have ENT re-evaluate, possible additional I&D verses incision to allow for better drainage  · Recommend supportive care with local wound care, warm compresses   · Analgesia with p r n  Oxycodone intravenous Dilaudid for breakthrough  COVID-19  Assessment & Plan  Noted to be COVID -19 positive 12/25  · Asymptomatic   · No treatment at this time  · Isolation through 1/4    Chronic, continuous use of opioids  Assessment & Plan  Patient with history of substance abuse maintained on methadone  Prior provider verified with [facility] patient takes 40 mg daily  · Of note UDS positive for oxycodone, cocaine, opiates  Hypokalemia-resolved as of 12/30/2021  Assessment & Plan  · Resolved     Hyponatremia-resolved as of 12/30/2021  Assessment & Plan  · Resolved      VTE Pharmacologic Prophylaxis: VTE Score: 1 Moderate Risk (Score 3-4) - Pharmacological DVT Prophylaxis Contraindicated  Sequential Compression Devices Ordered  Patient Centered Rounds: I performed bedside rounds with nursing staff today    Discussions with Specialists or Other Care Team Provider: nursing, ENT, ID    Education and Discussions with Family / Patient: Patient declined call to   Time Spent for Care: 20 minutes  More than 50% of total time spent on counseling and coordination of care as described above  Current Length of Stay: 4 day(s)  Current Patient Status: Inpatient   Certification Statement: The patient will continue to require additional inpatient hospital stay due to Possible repeat I&D, intravenous antibiotics  Discharge Plan: Anticipate discharge tomorrow to home  Code Status: Level 1 - Full Code    Subjective:   Patient reports significant improvement in facial swelling and tenderness  Continues to have some drainage  Objective:     Vitals:   Temp (24hrs), Av 2 °F (36 8 °C), Min:98 °F (36 7 °C), Max:98 4 °F (36 9 °C)    Temp:  [98 °F (36 7 °C)-98 4 °F (36 9 °C)] 98 °F (36 7 °C)  HR:  [73-79] 77  Resp:  [17-18] 18  BP: (118-124)/(77-80) 124/80  SpO2:  [95 %] 95 %  There is no height or weight on file to calculate BMI  Input and Output Summary (last 24 hours): Intake/Output Summary (Last 24 hours) at 2021 1054  Last data filed at 2021 0936  Gross per 24 hour   Intake 2160 ml   Output --   Net 2160 ml       Physical Exam:   Physical Exam  Vitals and nursing note reviewed  HENT:      Head:      Comments: Left facial swelling improved  Cardiovascular:      Rate and Rhythm: Normal rate  Pulmonary:      Breath sounds: Normal breath sounds  Abdominal:      Tenderness: There is no abdominal tenderness  Musculoskeletal:         General: No swelling  Skin:     General: Skin is warm  Neurological:      Mental Status: She is alert and oriented to person, place, and time  Mental status is at baseline     Psychiatric:         Mood and Affect: Mood normal           Additional Data:     Labs:  Results from last 7 days   Lab Units 21  0527   WBC Thousand/uL 4 45   HEMOGLOBIN g/dL 14 1   HEMATOCRIT % 42 9   PLATELETS Thousands/uL 263   NEUTROS PCT % 44   LYMPHS PCT % 43   MONOS PCT % 10   EOS PCT % 2     Results from last 7 days   Lab Units 12/31/21  0527 12/29/21  0538 12/28/21  0552   SODIUM mmol/L 138   < > 137   POTASSIUM mmol/L 3 8   < > 3 1*   CHLORIDE mmol/L 106   < > 101   CO2 mmol/L 28   < > 28   BUN mg/dL 9   < > 9   CREATININE mg/dL 0 69   < > 0 61   ANION GAP mmol/L 4   < > 8   CALCIUM mg/dL 8 8   < > 8 6   ALBUMIN g/dL  --   --  2 8*   TOTAL BILIRUBIN mg/dL  --   --  0 49   ALK PHOS U/L  --   --  43*   ALT U/L  --   --  19   AST U/L  --   --  23   GLUCOSE RANDOM mg/dL 99   < > 87    < > = values in this interval not displayed  Results from last 7 days   Lab Units 12/25/21  1546   LACTIC ACID mmol/L 1 0       Lines/Drains:  Invasive Devices  Report    Peripheral Intravenous Line            Peripheral IV 12/30/21 Left Wrist <1 day                      Imaging: No pertinent imaging reviewed      Recent Cultures (last 7 days):   Results from last 7 days   Lab Units 12/29/21  1212   GRAM STAIN RESULT  No Polys or Bacteria seen   BODY FLUID CULTURE, STERILE  1+ Growth of Methicillin Resistant Staphylococcus aureus*       Last 24 Hours Medication List:   Current Facility-Administered Medications   Medication Dose Route Frequency Provider Last Rate    acetaminophen  650 mg Oral Q6H PRN Sophia Suresh MD      aluminum-magnesium hydroxide-simethicone  30 mL Oral Q6H PRN Sophia Suresh MD      docusate sodium  100 mg Oral BID Sophia Suresh MD      HYDROmorphone  0 2 mg Intravenous Q6H PRN FANG Rivera      methadone  40 mg Oral Daily FANG Rivera      multivitamin-minerals  1 tablet Oral Daily Sophia Suresh MD      nicotine  1 patch Transdermal Daily Sophia Suresh MD      ondansetron  4 mg Intravenous Q6H PRN Sophia Suresh MD      oxyCODONE  2 5 mg Oral Q4H PRN FANG Rivera      oxyCODONE  5 mg Oral Q4H PRN FANG Schreiber      polyethylene glycol  17 g Oral Daily MD Sho Le vancomycin  12 5 mg/kg Intravenous Q12H FANG Rivera 750 mg (12/31/21 1691)        Today, Patient Was Seen By: FANG Neville    **Please Note: This note may have been constructed using a voice recognition system  **

## 2021-12-31 NOTE — PROGRESS NOTES
Progress Note - Infectious Disease   Zen Coughlin 49 y o  female MRN: [id number]  Unit/Bed#: [facility] 628-01 Encounter: [id number]      Impression/Plan:    1  Left facial abscess  Developed after she attempted to drain a pimple on her face, so concerning for skin rather than dental origin (patient also had all her teeth removed)  S/p I&D with ENT 21  Culture MRSA  -stop Unasyn   -for now, continue Vancomycin, dosing by pharmacy              -recommend ENT reevaluate for further drainage   -at discharge, recommend transition to PO Bactrim 1 DS BID to complete an additional 7 days from improved drainage today, end date 22              -monitor facial exam     2  COVID-19 infection  Patient asymptomatic, incidentally discovered on admission  She has received 2 doses Moderna vaccine but was not yet eligible for booster                 -no COVID therapies indicated              -complete 10 days isolation (1/3)     3  Chronic opioid use  On chronic methadone maintenance  UDS positive for cocaine, oxycodone, methadone               -continue chronic methadone     I have discussed the above management plan in detail with the SLIM PA and patient  ID will see again 1/3 if remains inpatient, but okay to discharge over the weekend if there is clinical improvement  Antibiotics:  Unasyn D7  Vancomycin D4    Subjective:  Let cheek swelling is much improved today, but she is still having pain  She is having drainage from the cheek and corner of her lips  Patient denies fever, chills, dysphagia, nausea  Objective:  Vitals:  Temp:  [98 °F (36 7 °C)-98 4 °F (36 9 °C)] 98 °F (36 7 °C)  HR:  [73-79] 77  Resp:  [17-18] 18  BP: (118-124)/(77-80) 124/80  SpO2:  [95 %] 95 %  Temp (24hrs), Av 2 °F (36 8 °C), Min:98 °F (36 7 °C), Max:98 4 °F (36 9 °C)  Current: Temperature: 98 °F (36 7 °C)    Physical Exam:   General Appearance:  Alert, interactive, nontoxic, no acute distress     Mouth: Edentulous   Lungs:   Clear to auscultation bilaterally; no wheezes, rhonchi or rales; respirations unlabored   Heart:  RRR; no murmur, rub or gallop   Abdomen:   Soft, non-tender, non-distended, positive bowel sounds  Extremities: No clubbing, cyanosis or edema   Skin: Left facial swelling and tenderness, drainage of purulent fluid from left corner of her lips and incision on cheek  Scab on left cheek       Labs: All pertinent labs and imaging studies were personally reviewed  Results from last 7 days   Lab Units 12/31/21 0527 12/30/21 0438 12/29/21  0538   WBC Thousand/uL 4 45 6 09 6 16   HEMOGLOBIN g/dL 14 1 13 6 13 3   PLATELETS Thousands/uL 263 224 214     Results from last 7 days   Lab Units 12/31/21 0527 12/30/21 0438 12/30/21 0438 12/29/21  0538 12/29/21  0538 12/28/21  0552 12/28/21  0552 12/25/21  1546 12/25/21  1546   SODIUM mmol/L 138  --  138  --  138   < > 137   < > 132*   POTASSIUM mmol/L 3 8   < > 3 8   < > 2 9*   < > 3 1*   < > 3 7   CHLORIDE mmol/L 106   < > 105   < > 104   < > 101   < > 94*   CO2 mmol/L 28   < > 30   < > 28   < > 28   < > 29   BUN mg/dL 9   < > 6   < > 5   < > 9   < > 20   CREATININE mg/dL 0 69   < > 0 67   < > 0 61   < > 0 61   < > 1 13*   EGFR ml/min/1 73sq m 99   < > 99   < > 103   < > 103   < > 55   CALCIUM mg/dL 8 8   < > 8 5   < > 8 8   < > 8 6   < > 9 0   AST U/L  --   --   --   --   --   --  23  --  24   ALT U/L  --   --   --   --   --   --  19   < > 13   ALK PHOS U/L  --   --   --   --   --   --  43*   < > 46 2    < > = values in this interval not displayed         Imaging:  I have personally reviewed pertinent imaging study reports and images in PACS      CT neck 12/25/21: soft tissue swelling along left face and mandibular region with associated abscess

## 2022-01-01 VITALS
DIASTOLIC BLOOD PRESSURE: 74 MMHG | SYSTOLIC BLOOD PRESSURE: 121 MMHG | TEMPERATURE: 98.1 F | HEART RATE: 78 BPM | RESPIRATION RATE: 18 BRPM | OXYGEN SATURATION: 95 %

## 2022-01-01 PROCEDURE — 99239 HOSP IP/OBS DSCHRG MGMT >30: CPT | Performed by: NURSE PRACTITIONER

## 2022-01-01 RX ORDER — OXYCODONE HYDROCHLORIDE AND ACETAMINOPHEN 5; 325 MG/1; MG/1
1 TABLET ORAL EVERY 4 HOURS PRN
Qty: 20 TABLET | Refills: 0 | Status: SHIPPED | OUTPATIENT
Start: 2022-01-01

## 2022-01-01 RX ORDER — SULFAMETHOXAZOLE AND TRIMETHOPRIM 800; 160 MG/1; MG/1
1 TABLET ORAL EVERY 12 HOURS SCHEDULED
Qty: 13 TABLET | Refills: 0 | Status: SHIPPED | OUTPATIENT
Start: 2022-01-01 | End: 2022-01-08

## 2022-01-01 RX ORDER — METHADONE HYDROCHLORIDE 10 MG/ML
40 CONCENTRATE ORAL DAILY
COMMUNITY

## 2022-01-01 RX ORDER — SULFAMETHOXAZOLE AND TRIMETHOPRIM 800; 160 MG/1; MG/1
1 TABLET ORAL EVERY 12 HOURS SCHEDULED
Status: DISCONTINUED | OUTPATIENT
Start: 2022-01-01 | End: 2022-01-01 | Stop reason: HOSPADM

## 2022-01-01 RX ADMIN — NICOTINE 1 PATCH: 14 PATCH, EXTENDED RELEASE TRANSDERMAL at 08:29

## 2022-01-01 RX ADMIN — VANCOMYCIN HYDROCHLORIDE 750 MG: 750 INJECTION, SOLUTION INTRAVENOUS at 04:25

## 2022-01-01 RX ADMIN — Medication 1 TABLET: at 08:28

## 2022-01-01 RX ADMIN — OXYCODONE HYDROCHLORIDE 5 MG: 5 TABLET ORAL at 04:32

## 2022-01-01 RX ADMIN — Medication 40 MG: at 08:28

## 2022-01-01 RX ADMIN — ALUMINA, MAGNESIA, AND SIMETHICONE ORAL SUSPENSION REGULAR STRENGTH 30 ML: 1200; 1200; 120 SUSPENSION ORAL at 01:18

## 2022-01-01 RX ADMIN — SULFAMETHOXAZOLE AND TRIMETHOPRIM 1 TABLET: 800; 160 TABLET ORAL at 10:53

## 2022-01-01 NOTE — PLAN OF CARE
Problem: PAIN - ADULT  Goal: Verbalizes/displays adequate comfort level or baseline comfort level  Description: Interventions:  - Encourage patient to monitor pain and request assistance  - Assess pain using appropriate pain scale  - Administer analgesics based on type and severity of pain and evaluate response  - Implement non-pharmacological measures as appropriate and evaluate response  - Consider cultural and social influences on pain and pain management  - Notify physician/advanced practitioner if interventions unsuccessful or patient reports new pain  Outcome: Progressing     Problem: INFECTION - ADULT  Goal: Absence or prevention of progression during hospitalization  Description: INTERVENTIONS:  - Assess and monitor for signs and symptoms of infection  - Monitor lab/diagnostic results  - Monitor all insertion sites, i e  indwelling lines, tubes, and drains  - [place] appropriate cooling/warming therapies per order  - Administer medications as ordered  - Instruct and encourage patient and family to use good hand hygiene technique  - Identify and instruct in appropriate isolation precautions for identified infection/condition  Outcome: Progressing  Goal: Absence of fever/infection during neutropenic period  Description: INTERVENTIONS:  - Monitor WBC    Outcome: Progressing

## 2022-01-01 NOTE — CONSULTS
This patient's vancomycin therapy has been discontinued  Thank you for this consult; Pharmacy will sign off now      Lina GregoryD

## 2022-01-01 NOTE — DISCHARGE SUMMARY
Discharge Summary - [facility] Internal Medicine    Patient Information: Thom Gregg 49 y o  female MRN: [id number]  Unit/Bed#: [facility] 628-01 Encounter: [id number]    Discharging Physician / Practitioner: FANG Gonzalez  PCP: Nicol Gary MD  Admission Date: 12/27/2021  Discharge Date: 01/01/22    Reason for Admission:  Left facial abscess    Discharge Diagnoses:     Principal Problem:    Facial abscess  Active Problems:    COVID-19    Chronic, continuous use of opioids  Resolved Problems:    Hypokalemia    Hyponatremia      Consultations During Hospital Stay:  · Id  · ENT  · OMFS    Procedures Performed:     · Bedside I and D by ENT 12/29    Significant Findings / Test Results:   · CT soft tissue neck with contrast Soft tissue swelling along the left face and mandibular region  There is a complex collection which appears to extend from the skin surface into a deeper abscess fluid collection which measures 2 4 x 1 8 x 1 8 cm  Complete right maxillary sinus opacification with opacification of the right anterior ethmoid air cells suggesting sinusitis  Emphysematous lung changes  Ground glass opacities could relate to multifocal pneumonia such as Covid 19  · Wound culture MRSA  · COVID-19 PCR positive    Incidental Findings:   · None    Test Results Pending at Discharge (will require follow up): · None     Outpatient Tests Requested:  · Outpatient follow-up with PCP and ENT    Complications:  None    Hospital Course:     Thom Gregg is a 49 y o  female patient with past medical history of chronic opioid use on methadone who originally presented to the hospital on 12/27/2021 due to left facial abscess  Patient was seen by OMFS, not a done a genic in origin so subsequently seen by ENT and underwent bedside I and D on 11/29  Wound culture grew MRSA  Patient was seen by ID, treated with intravenous vancomycin with transition to Bactrim for additional 7 days on discharge    Significant improvement throughout hospitalization  Should follow-up with PCP and ENT as outpatient  Incidentally found to be positive for COVID  Asymptomatic  Isolation through 1/4  Condition at Discharge: stable     Discharge Day Visit / Exam:     Subjective:  Patient reports significant improvement in swelling and tenderness  States that last night, an excessive amount of drainage suddenly popped out and oozed  Vitals: Blood Pressure: 121/74 (01/01/22 0833)  Pulse: 78 (01/01/22 0833)  Temperature: 98 1 °F (36 7 °C) (01/01/22 0833)  Temp Source: Oral (01/01/22 9996)  Respirations: 18 (01/01/22 0833)  SpO2: 95 % (01/01/22 0596)  Exam:   Physical Exam  Vitals and nursing note reviewed  Constitutional:       Appearance: She is obese  HENT:      Head:      Comments: Left facial swelling significantly improved  Cardiovascular:      Rate and Rhythm: Normal rate  Pulmonary:      Breath sounds: Normal breath sounds  Abdominal:      Tenderness: There is no abdominal tenderness  Musculoskeletal:         General: No swelling  Skin:     General: Skin is warm  Neurological:      Mental Status: She is alert and oriented to person, place, and time  Mental status is at baseline  Psychiatric:         Mood and Affect: Mood normal          Discussion with Family: patient     Discharge instructions/Information to patient and family:   See after visit summary for information provided to patient and family  Provisions for Follow-Up Care:  See after visit summary for information related to follow-up care and any pertinent home health orders  Disposition:     Home    For Discharges to [facility] SNF:   · Not Applicable to this Patient - Not Applicable to this Patient    Planned Readmission: no     Discharge Statement:  I spent 40 minutes discharging the patient  This time was spent on the day of discharge  I had direct contact with the patient on the day of discharge   Greater than 50% of the total time was spent examining patient, answering all patient questions, arranging and discussing plan of care with patient as well as directly providing post-discharge instructions  Additional time then spent on discharge activities  Discharge Medications:  See after visit summary for reconciled discharge medications provided to patient and family        ** Please Note: This note has been constructed using a voice recognition system **

## 2022-01-01 NOTE — PROGRESS NOTES
Vancomycin IV Pharmacy-to-Dose Consultation    sAh English is a 49 y o  female who is currently receiving Vancomycin IV (MRSA confirmed) with management by the Pharmacy Consult service  Assessment/Plan:  The patient was reviewed  Renal function is stable and no signs or symptoms of nephrotoxicity and/or infusion reactions were documented in the chart  Based on todays assessment, continue current vancomycin (day 5 ) dosing of 750 mg IV q12h (goal trough 10-15), with a plan for trough to be drawn at 1640 on 1/4  We will continue to follow the patients culture results and clinical progress daily      Nazia Oakley, Pharmacist

## 2022-01-01 NOTE — PLAN OF CARE
Problem: PAIN - ADULT  Goal: Verbalizes/displays adequate comfort level or baseline comfort level  Description: Interventions:  - Encourage patient to monitor pain and request assistance  - Assess pain using appropriate pain scale  - Administer analgesics based on type and severity of pain and evaluate response  - Implement non-pharmacological measures as appropriate and evaluate response  - Consider cultural and social influences on pain and pain management  - Notify physician/advanced practitioner if interventions unsuccessful or patient reports new pain  Outcome: Progressing     Problem: INFECTION - ADULT  Goal: Absence or prevention of progression during hospitalization  Description: INTERVENTIONS:  - Assess and monitor for signs and symptoms of infection  - Monitor lab/diagnostic results  - Monitor all insertion sites, i e  indwelling lines, tubes, and drains  - [place] appropriate cooling/warming therapies per order  - Administer medications as ordered  - Instruct and encourage patient and family to use good hand hygiene technique  - Identify and instruct in appropriate isolation precautions for identified infection/condition  Outcome: Progressing     Problem: INFECTION - ADULT  Goal: Absence of fever/infection during neutropenic period  Description: INTERVENTIONS:  - Monitor WBC    Outcome: Progressing     Problem: SAFETY ADULT  Goal: Patient will remain free of falls  Description: INTERVENTIONS:  - Educate patient/family on patient safety including physical limitations  - Instruct patient to call for assistance with activity   - Consult OT/PT to assist with strengthening/mobility   - Keep Call bell within reach  - Keep bed low and locked with side rails adjusted as appropriate  - Keep care items and personal belongings within reach  - Initiate and maintain comfort rounds  - Make Fall Risk Sign visible to staff  - Offer Toileting every 2 Hours, in advance of need  - Initiate/Maintain alarm  - Obtain necessary fall risk management equipment:  - Apply yellow socks and bracelet for high fall risk patients  - Consider moving patient to room near nurses station  Outcome: Progressing     Problem: DISCHARGE PLANNING  Goal: Discharge to home or other facility with appropriate resources  Description: INTERVENTIONS:  - Identify barriers to discharge w/patient and caregiver  - Arrange for needed discharge resources and transportation as appropriate  - Identify discharge learning needs (meds, wound care, etc )  - Arrange for interpretive services to assist at discharge as needed  - Refer to Case Management Department for coordinating discharge planning if the patient needs post-hospital services based on physician/advanced practitioner order or complex needs related to functional status, cognitive ability, or social support system  Outcome: Progressing     Problem: SKIN/TISSUE INTEGRITY - ADULT  Goal: Skin Integrity remains intact(Skin Breakdown Prevention)  Description: Assess:  -Perform Ravi assessment  -Clean and moisturize skin   -Inspect skin when repositioning, toileting, and assisting with ADLS  -Assess under medical devices  -Assess extremities for adequate circulation and sensation     Bed Management:  -Have minimal linens on bed & keep smooth, unwrinkled  -Change linens as needed when moist or perspiring    Toileting:  -Offer bedside commode    Activity:  -Mobilize patient   -Encourage activity and walks on unit  -Encourage or provide ROM exercises   -Turn and reposition patient every 2 Hours  -Use appropriate equipment to lift or move patient in bed    Skin Care:  -Avoid use of baby powder, tape, friction and shearing, hot water or constrictive clothing  -Relieve pressure over bony prominences   -Do not massage red bony areas    Outcome: Progressing

## 2023-03-12 ENCOUNTER — APPOINTMENT (EMERGENCY)
Dept: RADIOLOGY | Facility: HOSPITAL | Age: 58
End: 2023-03-12

## 2023-03-12 ENCOUNTER — HOSPITAL ENCOUNTER (EMERGENCY)
Facility: HOSPITAL | Age: 58
Discharge: HOME | End: 2023-03-12
Attending: EMERGENCY MEDICINE

## 2023-03-12 VITALS
RESPIRATION RATE: 20 BRPM | TEMPERATURE: 98.4 F | BODY MASS INDEX: 23.56 KG/M2 | SYSTOLIC BLOOD PRESSURE: 133 MMHG | HEIGHT: 60 IN | OXYGEN SATURATION: 94 % | DIASTOLIC BLOOD PRESSURE: 75 MMHG | HEART RATE: 103 BPM | WEIGHT: 120 LBS

## 2023-03-12 DIAGNOSIS — R05.9 COUGH: Primary | ICD-10-CM

## 2023-03-12 DIAGNOSIS — M79.10 MYALGIA: ICD-10-CM

## 2023-03-12 DIAGNOSIS — E87.6 HYPOKALEMIA: ICD-10-CM

## 2023-03-12 LAB
ALBUMIN SERPL BCP-MCNC: 3.6 G/DL (ref 3.5–5)
ALP SERPL-CCNC: 47 U/L (ref 34–104)
ALT SERPL W P-5'-P-CCNC: 16 U/L (ref 7–52)
AMPHETAMINES SERPL QL SCN: POSITIVE
ANION GAP SERPL CALCULATED.3IONS-SCNC: 12 MMOL/L (ref 4–13)
AST SERPL W P-5'-P-CCNC: 14 U/L (ref 13–39)
BACTERIA UR QL AUTO: ABNORMAL /HPF
BARBITURATES UR QL: NEGATIVE
BASOPHILS # BLD AUTO: 0.01 THOUSANDS/ÂΜL (ref 0–0.1)
BASOPHILS NFR BLD AUTO: 0 % (ref 0–1)
BENZODIAZ UR QL: NEGATIVE
BILIRUB SERPL-MCNC: 0.55 MG/DL (ref 0.2–1)
BILIRUB UR QL STRIP: ABNORMAL
BUN SERPL-MCNC: 5 MG/DL (ref 5–25)
CALCIUM SERPL-MCNC: 9.1 MG/DL (ref 8.4–10.2)
CHLORIDE SERPL-SCNC: 94 MMOL/L (ref 96–108)
CLARITY UR: ABNORMAL
CO2 SERPL-SCNC: 29 MMOL/L (ref 21–32)
COCAINE UR QL: NEGATIVE
COLOR UR: YELLOW
CREAT SERPL-MCNC: 0.55 MG/DL (ref 0.6–1.3)
EOSINOPHIL # BLD AUTO: 0.01 THOUSAND/ÂΜL (ref 0–0.61)
EOSINOPHIL NFR BLD AUTO: 0 % (ref 0–6)
ERYTHROCYTE [DISTWIDTH] IN BLOOD BY AUTOMATED COUNT: 11.9 % (ref 11.6–15.1)
FLUAV RNA RESP QL NAA+PROBE: NEGATIVE
FLUBV RNA RESP QL NAA+PROBE: NEGATIVE
GFR SERPL CREATININE-BSD FRML MDRD: 105 ML/MIN/1.73SQ M
GLUCOSE SERPL-MCNC: 184 MG/DL (ref 65–140)
GLUCOSE UR STRIP-MCNC: ABNORMAL MG/DL
HCT VFR BLD AUTO: 40.7 % (ref 34.8–46.1)
HGB BLD-MCNC: 13.7 G/DL (ref 11.5–15.4)
HGB UR QL STRIP.AUTO: ABNORMAL
IMM GRANULOCYTES # BLD AUTO: 0.04 THOUSAND/UL (ref 0–0.2)
IMM GRANULOCYTES NFR BLD AUTO: 0 % (ref 0–2)
KETONES UR STRIP-MCNC: ABNORMAL MG/DL
LEUKOCYTE ESTERASE UR QL STRIP: ABNORMAL
LYMPHOCYTES # BLD AUTO: 0.96 THOUSANDS/ÂΜL (ref 0.6–4.47)
LYMPHOCYTES NFR BLD AUTO: 9 % (ref 14–44)
MCH RBC QN AUTO: 30.1 PG (ref 26.8–34.3)
MCHC RBC AUTO-ENTMCNC: 33.7 G/DL (ref 31.4–37.4)
MCV RBC AUTO: 90 FL (ref 82–98)
METHADONE UR QL: NEGATIVE
MONOCYTES # BLD AUTO: 0.66 THOUSAND/ÂΜL (ref 0.17–1.22)
MONOCYTES NFR BLD AUTO: 6 % (ref 4–12)
MUCOUS THREADS UR QL AUTO: ABNORMAL
NEUTROPHILS # BLD AUTO: 8.63 THOUSANDS/ÂΜL (ref 1.85–7.62)
NEUTS SEG NFR BLD AUTO: 85 % (ref 43–75)
NITRITE UR QL STRIP: NEGATIVE
NON-SQ EPI CELLS URNS QL MICRO: ABNORMAL /HPF
NRBC BLD AUTO-RTO: 0 /100 WBCS
OPIATES UR QL SCN: NEGATIVE
OXYCODONE+OXYMORPHONE UR QL SCN: NEGATIVE
PCP UR QL: NEGATIVE
PH UR STRIP.AUTO: 7 [PH]
PLATELET # BLD AUTO: 400 THOUSANDS/UL (ref 149–390)
PMV BLD AUTO: 11 FL (ref 8.9–12.7)
POTASSIUM SERPL-SCNC: 2.7 MMOL/L (ref 3.5–5.3)
PROT SERPL-MCNC: 7.7 G/DL (ref 6.4–8.4)
PROT UR STRIP-MCNC: ABNORMAL MG/DL
RBC # BLD AUTO: 4.55 MILLION/UL (ref 3.81–5.12)
RBC #/AREA URNS AUTO: ABNORMAL /HPF
RSV RNA RESP QL NAA+PROBE: NEGATIVE
SARS-COV-2 RNA RESP QL NAA+PROBE: NEGATIVE
SODIUM SERPL-SCNC: 135 MMOL/L (ref 135–147)
SP GR UR STRIP.AUTO: 1.01 (ref 1–1.03)
THC UR QL: NEGATIVE
URINE COMMENT: ABNORMAL
UROBILINOGEN UR QL STRIP.AUTO: >=8 E.U./DL
WBC # BLD AUTO: 10.31 THOUSAND/UL (ref 4.31–10.16)
WBC #/AREA URNS AUTO: ABNORMAL /HPF

## 2023-03-12 RX ORDER — POTASSIUM CHLORIDE 20 MEQ/1
40 TABLET, EXTENDED RELEASE ORAL ONCE
Status: COMPLETED | OUTPATIENT
Start: 2023-03-12 | End: 2023-03-12

## 2023-03-12 RX ORDER — BUPRENORPHINE HYDROCHLORIDE AND NALOXONE HYDROCHLORIDE DIHYDRATE 8; 2 MG/1; MG/1
1 TABLET SUBLINGUAL DAILY
COMMUNITY

## 2023-03-12 RX ORDER — CLONIDINE HYDROCHLORIDE 0.1 MG/1
0.1 TABLET ORAL ONCE
Status: COMPLETED | OUTPATIENT
Start: 2023-03-12 | End: 2023-03-12

## 2023-03-12 RX ORDER — POTASSIUM CHLORIDE 20 MEQ/1
20 TABLET, EXTENDED RELEASE ORAL DAILY
Qty: 3 TABLET | Refills: 0 | Status: SHIPPED | OUTPATIENT
Start: 2023-03-12

## 2023-03-12 RX ORDER — IBUPROFEN 600 MG/1
600 TABLET ORAL ONCE
Status: COMPLETED | OUTPATIENT
Start: 2023-03-12 | End: 2023-03-12

## 2023-03-12 RX ADMIN — IBUPROFEN 600 MG: 600 TABLET, FILM COATED ORAL at 11:55

## 2023-03-12 RX ADMIN — POTASSIUM CHLORIDE 40 MEQ: 1500 TABLET, EXTENDED RELEASE ORAL at 12:10

## 2023-03-12 RX ADMIN — CLONIDINE HYDROCHLORIDE 0.1 MG: 0.1 TABLET ORAL at 11:12

## 2023-03-12 NOTE — ED PROVIDER NOTES
History  Chief Complaint   Patient presents with   • Shortness of Breath     Brought in by EMS for shortness of breath, loss of appetite, cough x 1 week, VSS, also reports feeling "shaky" and generalized weakness and pain     PMH chronic neck pain, old records show history of polysubstance abuse  PSH cervical fusion  Patient presents to emergency department c/o not feeling well with cough, shaking, shortness of breath, phlegm causing her to spit it up, myalgias, generalized weakness  Patient states she was on opiate pain medicine for a long time for chronic neck pain, was trying to get off, so was switched to methadone then try to wean off of that , so 3 months ago was switched to Suboxone unknown dosing, states she got it filled at a [facility] in [place] does not know the name of street, by a Dr Kami Zaragoza again in [place]  Patient states she takes small pieces of the film twice a day, then states she does not take it like she should, not every day,  then states she takes it every day , and may be took too much today that is why she does not feel right; has the packet with film with her in her purse  Prior to Admission Medications   Prescriptions Last Dose Informant Patient Reported? Taking? Doxylamine Succinate, Sleep, (UNISOM PO)   Yes No   Sig: Take by mouth   Multiple Vitamin (MULTIVITAMIN) capsule   Yes No   Sig: Take 1 capsule by mouth daily     buprenorphine-naloxone (SUBOXONE) 8-2 mg per SL tablet 3/12/2023  Yes Yes   Sig: Place 1 tablet under the tongue daily   naproxen (NAPROSYN) 500 mg tablet   No No   Sig: Take 1 tablet (500 mg total) by mouth 2 (two) times a day with meals for 5 days   oxyCODONE-acetaminophen (Percocet) 5-325 mg per tablet   No No   Sig: Take 1 tablet by mouth every 4 (four) hours as needed for moderate pain or severe pain Max Daily Amount: 6 tablets      Facility-Administered Medications: None       Past Medical History:   Diagnosis Date   • Chronic pain     neck Past Surgical History:   Procedure Laterality Date   • CERVICAL FUSION     • NECK SURGERY         History reviewed  No pertinent family history  I have reviewed and agree with the history as documented  E-Cigarette/Vaping   • E-Cigarette Use Never User      E-Cigarette/Vaping Substances     Social History     Tobacco Use   • Smoking status: Every Day     Packs/day: 0 50     Types: Cigarettes   • Smokeless tobacco: Never   Vaping Use   • Vaping Use: Never used   Substance Use Topics   • Alcohol use: No   • Drug use: No       Review of Systems   Constitutional: Positive for fatigue  Negative for diaphoresis and fever  HENT: Negative for congestion and trouble swallowing  Respiratory: Positive for cough and shortness of breath  Cardiovascular: Negative for chest pain, palpitations and leg swelling  Gastrointestinal: Positive for nausea and vomiting  Negative for constipation and diarrhea  Skin: Negative for rash and wound  Neurological: Negative for weakness and headaches  Psychiatric/Behavioral: Positive for agitation  Negative for sleep disturbance and suicidal ideas  All other systems reviewed and are negative  Physical Exam  Physical Exam  Vitals and nursing note reviewed  Constitutional:       General: She is in acute distress (mild)  Appearance: She is well-developed  HENT:      Head: Normocephalic and atraumatic  Right Ear: External ear normal       Left Ear: External ear normal       Nose: Nose normal       Mouth/Throat:      Mouth: Mucous membranes are moist       Pharynx: Oropharynx is clear  Eyes:      Conjunctiva/sclera: Conjunctivae normal    Cardiovascular:      Rate and Rhythm: Normal rate and regular rhythm  Pulmonary:      Effort: Pulmonary effort is normal  No tachypnea or respiratory distress  Breath sounds: Normal breath sounds  No wheezing or rhonchi  Abdominal:      General: Bowel sounds are normal       Palpations: Abdomen is soft  Musculoskeletal:         General: Normal range of motion  Cervical back: Normal range of motion and neck supple  Right lower leg: No edema  Left lower leg: No edema  Lymphadenopathy:      Cervical: No cervical adenopathy  Skin:     General: Skin is warm and dry  Capillary Refill: Capillary refill takes less than 2 seconds  Findings: No rash  Neurological:      General: No focal deficit present  Mental Status: She is alert  Motor: No weakness  Psychiatric:         Behavior: Behavior normal          Vital Signs  ED Triage Vitals   Temperature Pulse Respirations Blood Pressure SpO2   03/12/23 1044 03/12/23 1047 03/12/23 1044 03/12/23 1047 03/12/23 1044   98 4 °F (36 9 °C) 95 20 142/68 97 %      Temp Source Heart Rate Source Patient Position - Orthostatic VS BP Location FiO2 (%)   03/12/23 1044 03/12/23 1044 03/12/23 1044 03/12/23 1044 --   Oral Monitor Lying Right arm       Pain Score       03/12/23 1044       9           Vitals:    03/12/23 1044 03/12/23 1047 03/12/23 1200   BP:  142/68 133/75   Pulse:  95 103   Patient Position - Orthostatic VS: Lying Lying          Visual Acuity      ED Medications  Medications   cloNIDine (CATAPRES) tablet 0 1 mg (0 1 mg Oral Given 3/12/23 1112)   ibuprofen (MOTRIN) tablet 600 mg (600 mg Oral Given 3/12/23 1155)   potassium chloride (K-DUR,KLOR-CON) CR tablet 40 mEq (40 mEq Oral Given 3/12/23 1210)       Diagnostic Studies  Results Reviewed     Procedure Component Value Units Date/Time    Urine Microscopic [[id number]]  (Abnormal) Collected: 03/12/23 1141    Lab Status: Edited Result - FINAL Specimen: Urine, Clean Catch Updated: 03/12/23 1232     RBC, UA 0-5 /hpf      WBC, UA 4-10 /hpf      Epithelial Cells Innumerable /hpf      Bacteria, UA Occasional /hpf      MUCUS THREADS Innumerable     URINE COMMENT Concentrated Microscopic on low volume urine      Rapid drug screen, urine [[id number]]  (Abnormal) Collected: 03/12/23 1141    Lab Status: Final result Specimen: Urine, Clean Catch Updated: 03/12/23 1212     Amph/Meth UR Positive     Barbiturate Ur Negative     Benzodiazepine Urine Negative     Cocaine Urine Negative     Methadone Urine Negative     Opiate Urine Negative     PCP Ur Negative     THC Urine Negative     Oxycodone Urine Negative    Narrative:      FOR MEDICAL PURPOSES ONLY  IF CONFIRMATION NEEDED PLEASE CONTACT THE LAB WITHIN 5 DAYS  Drug Screen Cutoff Levels:  AMPHETAMINE/METHAMPHETAMINES  1000 ng/mL  BARBITURATES     200 ng/mL  BENZODIAZEPINES     200 ng/mL  COCAINE      300 ng/mL  METHADONE      300 ng/mL  OPIATES      300 ng/mL  PHENCYCLIDINE     25 ng/mL  THC       50 ng/mL  OXYCODONE      100 ng/mL    FLU/RSV/COVID - if FLU/RSV clinically relevant [[id number]]  (Normal) Collected: 03/12/23 1114    Lab Status: Final result Specimen: Nares from Nose Updated: 03/12/23 1203     SARS-CoV-2 Negative     INFLUENZA A PCR Negative     INFLUENZA B PCR Negative     RSV PCR Negative    Narrative:      FOR PEDIATRIC PATIENTS - copy/paste COVID Guidelines URL to browser: [email or web address]  BigFix    SARS-CoV-2 assay is a Nucleic Acid Amplification assay intended for the  qualitative detection of nucleic acid from SARS-CoV-2 in nasopharyngeal  swabs  Results are for the presumptive identification of SARS-CoV-2 RNA  Positive results are indicative of infection with SARS-CoV-2, the virus  causing COVID-19, but do not rule out bacterial infection or co-infection  with other viruses  Laboratories within the [place] and its  territories are required to report all positive results to the appropriate  public health authorities  Negative results do not preclude SARS-CoV-2  infection and should not be used as the sole basis for treatment or other  patient management decisions   Negative results must be combined with  clinical observations, patient history, and epidemiological information  This test has not been FDA cleared or approved  This test has been authorized by FDA under an Emergency Use Authorization  (EUA)  This test is only authorized for the duration of time the  declaration that circumstances exist justifying the authorization of the  emergency use of an in vitro diagnostic tests for detection of SARS-CoV-2  virus and/or diagnosis of COVID-19 infection under section 564(b)(1) of  the Act, 21 U  S C  589MSW-6(D)(6), unless the authorization is terminated  or revoked sooner  The test has been validated but independent review by FDA  and CLIA is pending  Test performed using [employer] Gene[employer]pert: This RT-PCR assay targets N2,  a region unique to SARS-CoV-2  A conserved region in the E-gene was chosen  for pan-Sarbecovirus detection which includes SARS-CoV-2  According to CMS-2020-01-R, this platform meets the definition of high-throughput technology      Comprehensive metabolic panel [[id number]]  (Abnormal) Collected: 03/12/23 1114    Lab Status: Final result Specimen: Blood from Arm, Right Updated: 03/12/23 1157     Sodium 135 mmol/L      Potassium 2 7 mmol/L      Chloride 94 mmol/L      CO2 29 mmol/L      ANION GAP 12 mmol/L      BUN 5 mg/dL      Creatinine 0 55 mg/dL      Glucose 184 mg/dL      Calcium 9 1 mg/dL      AST 14 U/L      ALT 16 U/L      Alkaline Phosphatase 47 U/L      Total Protein 7 7 g/dL      Albumin 3 6 g/dL      Total Bilirubin 0 55 mg/dL      eGFR 105 ml/min/1 73sq m     Narrative:      Ann guidelines for Chronic Kidney Disease (CKD):   •  Stage 1 with normal or high GFR (GFR > 90 mL/min/1 73 square meters)  •  Stage 2 Mild CKD (GFR = 60-89 mL/min/1 73 square meters)  •  Stage 3A Moderate CKD (GFR = 45-59 mL/min/1 73 square meters)  •  Stage 3B Moderate CKD (GFR = 30-44 mL/min/1 73 square meters)  •  Stage 4 Severe CKD (GFR = 15-29 mL/min/1 73 square meters)  •  Stage 5 End Stage CKD (GFR <15 mL/min/1 73 square meters)  Note: GFR calculation is accurate only with a steady state creatinine    UA w Reflex to Microscopic w Reflex to Culture [[id number]]  (Abnormal) Collected: 03/12/23 1141    Lab Status: Final result Specimen: Urine, Clean Catch Updated: 03/12/23 1150     Color, UA Yellow     Clarity, UA Slightly Cloudy     Specific Gravity, UA 1 015     pH, UA 7 0     Leukocytes, UA 1+     Nitrite, UA Negative     Protein, UA Trace mg/dl      Glucose, UA Trace mg/dl      Ketones, UA Trace mg/dl      Urobilinogen, UA >=8 0 E U /dl      Bilirubin, UA 1+     Occult Blood, UA 1+    CBC and differential [[id number]]  (Abnormal) Collected: 03/12/23 1114    Lab Status: Final result Specimen: Blood from Arm, Right Updated: 03/12/23 1122     WBC 10 31 Thousand/uL      RBC 4 55 Million/uL      Hemoglobin 13 7 g/dL      Hematocrit 40 7 %      MCV 90 fL      MCH 30 1 pg      MCHC 33 7 g/dL      RDW 11 9 %      MPV 11 0 fL      Platelets 116 Thousands/uL      nRBC 0 /100 WBCs      Neutrophils Relative 85 %      Immat GRANS % 0 %      Lymphocytes Relative 9 %      Monocytes Relative 6 %      Eosinophils Relative 0 %      Basophils Relative 0 %      Neutrophils Absolute 8 63 Thousands/µL      Immature Grans Absolute 0 04 Thousand/uL      Lymphocytes Absolute 0 96 Thousands/µL      Monocytes Absolute 0 66 Thousand/µL      Eosinophils Absolute 0 01 Thousand/µL      Basophils Absolute 0 01 Thousands/µL                  XR chest 2 views   ED Interpretation by Yayo Banerjee PA-C (03/12 1151)   Cc, copd                 Procedures  ECG 12 Lead Documentation Only    Date/Time: 3/12/2023 12:29 PM  Performed by: Yayo Banerjee PA-C  Authorized by: aYyo Banerjee PA-C     Indications / Diagnosis:  HypoK  ECG reviewed by me, the ED Provider: yes    Patient location:  ED  Previous ECG:     Comparison to cardiac monitor: Yes    Interpretation:     Interpretation: non-specific    Rate:     ECG rate:  95    ECG rate assessment: normal    Rhythm: Rhythm: sinus rhythm    Comments:      QTc 480, no acute ischemic changes             ED Course  ED Course as of 03/12/23 1237   Sun Mar 12, 2023   1154 WBC(!): 10 31   1154 CXR interpreted by me shows COPD, chronic change picture   1155 Patient complaining of leg cramping, restless legs, Motrin ordered   1224 Leukocytes, UA(!): 1+   1225 RBC, UA: 0-5   1225 WBC, UA(!): 4-10   1225 Epithelial Cells(!): Innumerable   1225 Bacteria, UA: Occasional   1225 MUCUS THREADS(!): Innumerable  Pt without UTI sx, will continue to monitor Urine culture to determine if patient needs antibx                               SBIRT 20yo+    Flowsheet Row Most Recent Value   SBIRT (23 yo +)    In order to provide better care to our patients, we are screening all of our patients for alcohol and drug use  Would it be okay to ask you these screening questions? No Filed at: 03/12/2023 1049                    Medical Decision Making  Patient here complaining of cough, cramping, shaking, possible withdrawal from Suboxone  Consider COPD, bronchitis, pneumonia, withdrawal, substance abuse, mental illness:anxiety, viral illness, electrolyte abnormality  At first patient states she was not taking it like she should and stated she get it filled at a [facility] pharmacy in [place], when I reviewed the [facility] there is no listing of prescription, patient then states she got it at the clinic in [place], she paid cash for the gave her the medication  Pt has packet showing Suboxone 8 mg / 2 mg states she takes small pieces of the film twice a day, did take today  Labs reviewed, HypoK, EKG ordered, no acute ischemia  PT feeling better, still having some leg cramps  Discussed UDS results with pt, states she took a "Dexatrim diet pill"; but I explained that amphetamines may cause some of her symptoms  PT referred back to [facility] clinic for further treatment, weaning      Amount and/or Complexity of Data Reviewed  External Data Reviewed: notes  Details: [place]  no suboxone rx noted  Labs: ordered  Decision-making details documented in ED Course  Radiology: ordered and independent interpretation performed  Decision-making details documented in ED Course  Discussion of management or test interpretation with external provider(s): [facility] on call Maite Granados, regarding if placement at our detox program is indicated, she reports, "We do not detox patients off of suboxone on our unit  She would have to work with whatever clinic is prescribing the suboxone to wean off of it"  Case discussed with attending Court Perrin who agrees with plan    Risk  Prescription drug management  Disposition  Final diagnoses:   Cough - COPD   Myalgia - possible withdrawal   Hypokalemia     Time reflects when diagnosis was documented in both MDM as applicable and the Disposition within this note     Time User Action Codes Description Comment    3/12/2023 12:32 PM Garcia Apo [R05 9] Cough     3/12/2023 12:32 PM Anthony Lapine [R05 9] Cough COPD    3/12/2023 12:32 PM Francisco Dony Add [M79 10] Myalgia     3/12/2023 12:32 PM Francisco Dony Modify [M79 10] Myalgia possible withdrawal    3/12/2023 12:32 PM Francisco Dony Add [E87 6] Hypokalemia       ED Disposition     ED Disposition   Discharge    Condition   Stable    Date/Time   Sun Mar 12, 2023 12:31 PM    Comment   [address] discharge to home/self care  Follow-up Information     Follow up With Specialties Details Why [address], [place]    [address] [place] [address]  [address]  In 1 day            Patient's Medications   Discharge Prescriptions    POTASSIUM CHLORIDE (K-DUR,KLOR-CON) 20 MEQ TABLET    Take 1 tablet (20 mEq total) by mouth daily       Start Date: 3/12/2023 End Date: --       Order Dose: 20 mEq       Quantity: 3 tablet    Refills: 0       No discharge procedures on file      PDMP Review     None          ED Provider  Electronically Signed by           Yaritza Lerma PA-C  03/12/23 7650

## 2023-03-12 NOTE — DISCHARGE INSTRUCTIONS
You need to follow-up with your Suboxone clinic for further instructions of withdrawal and how to wean off medications safely  Avoid all illicit drug substances  Avoid smoking    You can take Tylenol  and Motrin for the leg cramping and pain    Take all the potassium until done and increase the potassium in your diet    Follow-up with your primary care doctor as needed

## 2023-03-13 LAB
ATRIAL RATE: 95 BPM
P AXIS: 77 DEGREES
PR INTERVAL: 120 MS
QRS AXIS: 85 DEGREES
QRSD INTERVAL: 88 MS
QT INTERVAL: 382 MS
QTC INTERVAL: 480 MS
T WAVE AXIS: 53 DEGREES
VENTRICULAR RATE: 95 BPM

## 2024-12-01 ENCOUNTER — HOSPITAL ENCOUNTER (EMERGENCY)
Facility: HOSPITAL | Age: 59
Discharge: HOME | End: 2024-12-01
Attending: STUDENT IN AN ORGANIZED HEALTH CARE EDUCATION/TRAINING PROGRAM
Payer: COMMERCIAL

## 2024-12-01 VITALS
RESPIRATION RATE: 18 BRPM | HEART RATE: 84 BPM | WEIGHT: 132.2 LBS | TEMPERATURE: 96.6 F | BODY MASS INDEX: 25.82 KG/M2 | DIASTOLIC BLOOD PRESSURE: 85 MMHG | SYSTOLIC BLOOD PRESSURE: 162 MMHG | OXYGEN SATURATION: 98 %

## 2024-12-01 DIAGNOSIS — H65.193 ACUTE EFFUSION OF BOTH MIDDLE EARS: Primary | ICD-10-CM

## 2024-12-01 PROCEDURE — 99284 EMERGENCY DEPT VISIT MOD MDM: CPT | Performed by: STUDENT IN AN ORGANIZED HEALTH CARE EDUCATION/TRAINING PROGRAM

## 2024-12-01 PROCEDURE — 99283 EMERGENCY DEPT VISIT LOW MDM: CPT

## 2024-12-01 RX ADMIN — AMOXICILLIN AND CLAVULANATE POTASSIUM 1 TABLET: 875; 125 TABLET, COATED ORAL at 19:54

## 2024-12-02 NOTE — ED PROVIDER NOTES
"Time reflects when diagnosis was documented in both MDM as applicable and the Disposition within this note       Time User Action Codes Description Comment    12/1/2024  7:49 PM Rhett Babb Add [H65.193] Acute effusion of both middle ears           ED Disposition       ED Disposition   Discharge    Condition   Stable    Date/Time   Sun Dec 1, 2024  7:49 PM    Comment   Radha Booker discharge to home/self care.                   Assessment & Plan       Medical Decision Making  Patient is a 59 y.o. female who presents to the ED for ear popping, pain.  Patient is nontoxic, well-appearing.     Differential includes but is not limited to: Otitis media.  Presentation not consistent mastoiditis.    Plan: Antibiotics, discharge with ENT follow-up                   Risk  Prescription drug management.             Medications   amoxicillin-clavulanate (AUGMENTIN) 875-125 mg per tablet 1 tablet (1 tablet Oral Given 12/1/24 1954)       ED Risk Strat Scores                           SBIRT 22yo+      Flowsheet Row Most Recent Value   Initial Alcohol Screen: US AUDIT-C     1. How often do you have a drink containing alcohol? 0 Filed at: 12/01/2024 1940   Audit-C Score 0 Filed at: 12/01/2024 1940   BENJAMÍN: How many times in the past year have you...    Used an illegal drug or used a prescription medication for non-medical reasons? Never Filed at: 12/01/2024 1940                            History of Present Illness       Chief Complaint   Patient presents with    Ear Fullness     States she started with a fever and cold 4 days ago, states fever \" broke \" last night. States her ears were \" popping \" and she blew her nose 4 days ago and she lost her hearing in both and \" my ears feel clogged \".       Past Medical History:   Diagnosis Date    Chronic pain     neck      Past Surgical History:   Procedure Laterality Date    CERVICAL FUSION      NECK SURGERY        History reviewed. No pertinent family history.   Social History " "    Tobacco Use    Smoking status: Every Day     Current packs/day: 0.50     Types: Cigarettes    Smokeless tobacco: Never   Vaping Use    Vaping status: Never Used   Substance Use Topics    Alcohol use: No    Drug use: No      E-Cigarette/Vaping    E-Cigarette Use Never User       E-Cigarette/Vaping Substances      I have reviewed and agree with the history as documented.     Patient is a 59-year-old female who presents the emergency room for bilateral ear \" popping\" and fullness.  Started earlier today after yawning.  No modifying factors.  Associated with pain.  States she is having trouble hearing.  No fevers or chills.  No history of symptoms like this in the past.  No other complaints or concerns.      Ear Fullness  Associated symptoms: ear pain        Review of Systems   HENT:  Positive for ear pain.    All other systems reviewed and are negative.          Objective       ED Triage Vitals [12/01/24 1936]   Temperature Pulse Blood Pressure Respirations SpO2 Patient Position - Orthostatic VS   (!) 96.6 °F (35.9 °C) 84 162/85 18 98 % Sitting      Temp Source Heart Rate Source BP Location FiO2 (%) Pain Score    Tympanic Monitor Left arm -- 9      Vitals      Date and Time Temp Pulse SpO2 Resp BP Pain Score FACES Pain Rating User   12/01/24 1936 96.6 °F (35.9 °C) 84 98 % 18 162/85 9 -- SW            Physical Exam  Vitals and nursing note reviewed.   Constitutional:       General: She is not in acute distress.     Appearance: She is well-developed. She is not diaphoretic.   HENT:      Head: Normocephalic and atraumatic.      Right Ear: External ear normal. A middle ear effusion is present. Tympanic membrane is erythematous and bulging.      Left Ear: External ear normal. A middle ear effusion is present. Tympanic membrane is erythematous and bulging.      Nose: Nose normal.   Eyes:      General: Lids are normal. No scleral icterus.  Cardiovascular:      Rate and Rhythm: Normal rate and regular rhythm.      Heart " sounds: Normal heart sounds. No murmur heard.     No friction rub. No gallop.   Pulmonary:      Effort: Pulmonary effort is normal. No respiratory distress.      Breath sounds: Normal breath sounds. No wheezing or rales.   Musculoskeletal:         General: No deformity. Normal range of motion.      Cervical back: Normal range of motion and neck supple.   Skin:     General: Skin is warm and dry.   Neurological:      General: No focal deficit present.      Mental Status: She is alert.   Psychiatric:         Mood and Affect: Mood normal.         Behavior: Behavior normal.         Results Reviewed       None            No orders to display       Procedures    ED Medication and Procedure Management   Prior to Admission Medications   Prescriptions Last Dose Informant Patient Reported? Taking?   Doxylamine Succinate, Sleep, (UNISOM PO)   Yes No   Sig: Take by mouth   Multiple Vitamin (MULTIVITAMIN) capsule   Yes No   Sig: Take 1 capsule by mouth daily.   buprenorphine-naloxone (SUBOXONE) 8-2 mg per SL tablet   Yes No   Sig: Place 1 tablet under the tongue daily   naproxen (NAPROSYN) 500 mg tablet   No No   Sig: Take 1 tablet (500 mg total) by mouth 2 (two) times a day with meals for 5 days   potassium chloride (K-DUR,KLOR-CON) 20 mEq tablet   No No   Sig: Take 1 tablet (20 mEq total) by mouth daily      Facility-Administered Medications: None     Discharge Medication List as of 12/1/2024  7:51 PM        START taking these medications    Details   amoxicillin-clavulanate (AUGMENTIN) 875-125 mg per tablet Take 1 tablet by mouth every 12 (twelve) hours for 7 days, Starting Sun 12/1/2024, Until Sun 12/8/2024, Normal           CONTINUE these medications which have NOT CHANGED    Details   buprenorphine-naloxone (SUBOXONE) 8-2 mg per SL tablet Place 1 tablet under the tongue daily, Historical Med      Doxylamine Succinate, Sleep, (UNISOM PO) Take by mouth, Historical Med      Multiple Vitamin (MULTIVITAMIN) capsule Take 1  capsule by mouth daily., Until Discontinued, Historical Med      naproxen (NAPROSYN) 500 mg tablet Take 1 tablet (500 mg total) by mouth 2 (two) times a day with meals for 5 days, Starting Fri 12/6/2019, Until Wed 12/11/2019, Print      potassium chloride (K-DUR,KLOR-CON) 20 mEq tablet Take 1 tablet (20 mEq total) by mouth daily, Starting Sun 3/12/2023, Normal           No discharge procedures on file.  ED SEPSIS DOCUMENTATION   Time reflects when diagnosis was documented in both MDM as applicable and the Disposition within this note       Time User Action Codes Description Comment    12/1/2024  7:49 PM Rhett Babb Add [H65.193] Acute effusion of both middle ears                  Rhett Babb, DO  12/01/24 5188

## 2024-12-02 NOTE — DISCHARGE INSTRUCTIONS
You were seen in the emergency department for ear fullness and pain.  You will be started on antibiotics.  Please take as prescribed.  Please follow-up with your family doctor.  If you have persistent pain or fullness follow-up with ENT.  Call to schedule an appointment.  Return to the emergency department for any new or concerning symptoms.

## 2024-12-05 ENCOUNTER — OFFICE VISIT (OUTPATIENT)
Dept: FAMILY MEDICINE CLINIC | Facility: CLINIC | Age: 59
End: 2024-12-05
Payer: MEDICARE

## 2024-12-05 VITALS
HEIGHT: 60 IN | OXYGEN SATURATION: 96 % | WEIGHT: 130 LBS | DIASTOLIC BLOOD PRESSURE: 72 MMHG | BODY MASS INDEX: 25.52 KG/M2 | SYSTOLIC BLOOD PRESSURE: 126 MMHG | HEART RATE: 103 BPM

## 2024-12-05 DIAGNOSIS — G89.29 CHRONIC MIDLINE LOW BACK PAIN WITHOUT SCIATICA: ICD-10-CM

## 2024-12-05 DIAGNOSIS — H92.03 EARACHE SYMPTOMS IN BOTH EARS: Primary | ICD-10-CM

## 2024-12-05 DIAGNOSIS — E87.6 HYPOKALEMIA: ICD-10-CM

## 2024-12-05 DIAGNOSIS — M54.50 CHRONIC MIDLINE LOW BACK PAIN WITHOUT SCIATICA: ICD-10-CM

## 2024-12-05 DIAGNOSIS — J44.9 CHRONIC OBSTRUCTIVE PULMONARY DISEASE, UNSPECIFIED COPD TYPE (HCC): ICD-10-CM

## 2024-12-05 DIAGNOSIS — Z13.29 SCREENING FOR THYROID DISORDER: ICD-10-CM

## 2024-12-05 DIAGNOSIS — Z13.0 SCREENING FOR DEFICIENCY ANEMIA: ICD-10-CM

## 2024-12-05 DIAGNOSIS — F17.200 SMOKER: ICD-10-CM

## 2024-12-05 DIAGNOSIS — E78.5 DYSLIPIDEMIA: ICD-10-CM

## 2024-12-05 DIAGNOSIS — R73.03 PRE-DIABETES: ICD-10-CM

## 2024-12-05 PROBLEM — L02.01 FACIAL ABSCESS: Status: RESOLVED | Noted: 2021-12-27 | Resolved: 2024-12-05

## 2024-12-05 PROBLEM — F11.90 CHRONIC, CONTINUOUS USE OF OPIOIDS: Status: RESOLVED | Noted: 2021-12-27 | Resolved: 2024-12-05

## 2024-12-05 PROBLEM — U07.1 COVID-19: Status: RESOLVED | Noted: 2021-12-27 | Resolved: 2024-12-05

## 2024-12-05 PROBLEM — M27.2 MANDIBULAR ABSCESS: Status: RESOLVED | Noted: 2021-12-27 | Resolved: 2024-12-05

## 2024-12-05 PROCEDURE — 99204 OFFICE O/P NEW MOD 45 MIN: CPT | Performed by: INTERNAL MEDICINE

## 2024-12-05 RX ORDER — BACLOFEN 10 MG/1
5 TABLET ORAL 3 TIMES DAILY
Qty: 30 TABLET | Refills: 0 | Status: SHIPPED | OUTPATIENT
Start: 2024-12-05

## 2024-12-05 RX ORDER — POTASSIUM CHLORIDE 1500 MG/1
20 TABLET, EXTENDED RELEASE ORAL DAILY
Qty: 3 TABLET | Refills: 0 | Status: SHIPPED | OUTPATIENT
Start: 2024-12-05

## 2024-12-05 RX ORDER — LIDOCAINE 50 MG/G
1 PATCH TOPICAL DAILY
Qty: 30 PATCH | Refills: 1 | Status: SHIPPED | OUTPATIENT
Start: 2024-12-05

## 2024-12-05 NOTE — PROGRESS NOTES
Office Visit Note  24     Radha Booker 59 y.o. female MRN: [id number]  : 1965    Assessment:     1. Earache symptoms in both ears  Assessment & Plan:  Patient with bilateral earache received amoxicillin clavulanic acid to which she is responding really well less discomfort feeling we will continue to finish the antibiotics.  2. Smoker  Assessment & Plan:  Patient is smoking about half a pack per day discussed with patient at length regarding cessation of smoking and the consequences of continued smoking she wants to make a sincere effort now to quit she wants to try the NicoDerm patches which have prescribed.  Orders:  -     nicotine (NICODERM CQ) 7 mg/24hr TD 24 hr patch; Place 1 patch on the skin over 24 hours every 24 hours  3. Chronic midline low back pain without sciatica  Assessment & Plan:  Patient with a history of chronic low back pain without sciatica was in the past on Lidoderm patch and also muscle relaxer appears to have helped.  Exam is unremarkable movements of the lumbar spine causing some discomfort we will renew the medication Lidoderm patch for the back pain and will also prescribe baclofen 10 mg 3 times a day as needed.  Orders:  -     baclofen 10 mg tablet; Take 0.5 tablets (5 mg total) by mouth 3 (three) times a day  -     lidocaine (Lidoderm) 5 %; Apply 1 patch topically over 12 hours daily Remove & Discard patch within 12 hours or as directed by MD  -     BRIGIDO spine lumbar minimum 4 views non injury; Future; Expected date: 2024  4. Hypokalemia  Assessment & Plan:  Resolved follow-up with repeat labs    Orders:  -     potassium chloride (Klor-Con M20) 20 mEq tablet; Take 1 tablet (20 mEq total) by mouth daily  5. Dyslipidemia  -     Lipid panel; Future  6. Chronic obstructive pulmonary disease, unspecified COPD type (HCC)  Assessment & Plan:  Patient with history of chronic obstructive pulmonary disease currently not taking any inhalers expiration slightly prolonged no  wheezing noted recommend very strongly to quit smoking various options discussed NicoDerm patches have been added to help with cessation of smoking  Orders:  -     nicotine (NICODERM CQ) 7 mg/24hr TD 24 hr patch; Place 1 patch on the skin over 24 hours every 24 hours  7. Screening for thyroid disorder  -     TSH, 3rd generation with Free T4 reflex; Future; Expected date: 12/05/2024  8. Screening for deficiency anemia  -     CBC and differential; Future  9. Pre-diabetes  -     Comprehensive metabolic panel; Future  -     Hemoglobin A1C; Future; Expected date: 12/05/2024  -     UA w Reflex to Microscopic w Reflex to Culture; Future; Expected date: 12/05/2024        Depression Screening and Follow-up Plan: Patient was screened for depression during today's encounter. They screened negative with a PHQ-2 score of 0.    Tobacco Cessation Counseling: Tobacco cessation counseling was provided. The patient is sincerely urged to quit consumption of tobacco. She is ready to quit tobacco. Medication options and side effects of medication discussed. Patient agreed to medication. Nicotine patch was prescribed. Patient is currently smoking 11 cigarettes/day wants to quit smoking wants to try the patch will order the same          Discussion Summary and Plan:  Today's care plan and medications were reviewed with patient in detail and all their questions answered to their satisfaction.    Chief Complaint   Patient presents with    Earache      Subjective:  Patient is coming here for evaluation regarding pain in both years for which she was seen in the emergency room and was prescribed Augmentin.  She has popping sensation in both the ears.  Patient has allergic reaction to the prednisone in the past and was not given.    Patient complains of low back pain persistent increases with movements ambulation.  Feels like a spasm in the low back area.  Will get x-rays, Lidoderm patch, baclofen    Patient with history of COPD smoking about  11 cigarettes/day at 1 time she was also on inhalers.  Currently not using any.    History of hypokalemia in the past.  Will get complete lab work done        The following portions of the patient's history were reviewed and updated as appropriate: allergies, current medications, past family history, past medical history, past social history, past surgical history and problem list.    Review of Systems   Constitutional:  Negative for chills and fever.   HENT:  Positive for ear pain. Negative for sore throat.    Eyes:  Negative for pain and visual disturbance.   Respiratory:  Negative for cough and shortness of breath.    Cardiovascular:  Negative for chest pain and palpitations.   Gastrointestinal:  Negative for abdominal pain and vomiting.   Genitourinary:  Negative for dysuria and hematuria.   Musculoskeletal:  Negative for arthralgias and back pain.   Skin:  Negative for color change and rash.   Neurological:  Negative for seizures and syncope.   All other systems reviewed and are negative.        Historical Information   Patient Active Problem List   Diagnosis    Hypokalemia    Chronic midline low back pain without sciatica    Earache symptoms in both ears    Smoker    Chronic obstructive pulmonary disease, unspecified COPD type (HCC)     Past Medical History:   Diagnosis Date    Chronic pain     neck    Chronic, continuous use of opioids 12/27/2021    COVID-19 12/27/2021    Facial abscess 12/27/2021    Mandibular abscess 12/27/2021     Past Surgical History:   Procedure Laterality Date    CERVICAL FUSION      NECK SURGERY       Social History     Substance and Sexual Activity   Alcohol Use No     Social History     Substance and Sexual Activity   Drug Use No     Social History     Tobacco Use   Smoking Status Every Day    Current packs/day: 0.50    Types: Cigarettes   Smokeless Tobacco Never     History reviewed. No pertinent family history.  Health Maintenance Due   Topic    Hepatitis C Screening     Medicare  Annual Wellness Visit (AWV)     Pneumococcal Vaccine: Pediatrics (0 to 5 Years) and At-Risk Patients (6 to 64 Years) (1 of 2 - PCV)    HIV Screening     Cervical Cancer Screening     Breast Cancer Screening: Mammogram     Colorectal Cancer Screening     Zoster Vaccine (1 of 2)    Influenza Vaccine (1)    COVID-19 Vaccine (1 - 2024-25 season)      Meds/Allergies       Current Outpatient Medications:     amoxicillin-clavulanate (AUGMENTIN) 875-125 mg per tablet, Take 1 tablet by mouth every 12 (twelve) hours for 7 days, Disp: 14 tablet, Rfl: 0    baclofen 10 mg tablet, Take 0.5 tablets (5 mg total) by mouth 3 (three) times a day, Disp: 30 tablet, Rfl: 0    lidocaine (Lidoderm) 5 %, Apply 1 patch topically over 12 hours daily Remove & Discard patch within 12 hours or as directed by MD, Disp: 30 patch, Rfl: 1    nicotine (NICODERM CQ) 7 mg/24hr TD 24 hr patch, Place 1 patch on the skin over 24 hours every 24 hours, Disp: 28 patch, Rfl: 0    potassium chloride (Klor-Con M20) 20 mEq tablet, Take 1 tablet (20 mEq total) by mouth daily, Disp: 3 tablet, Rfl: 0      Objective:    Vitals:   /72 (BP Location: Right arm, Patient Position: Sitting, Cuff Size: Standard)   Pulse 103   Ht 5' (1.524 m)   Wt 59 kg (130 lb)   SpO2 96%   BMI 25.39 kg/m²   Body mass index is 25.39 kg/m².  Vitals:    12/05/24 1511   Weight: 59 kg (130 lb)       Physical Exam  Vitals and nursing note reviewed.   Constitutional:       Appearance: Normal appearance.   HENT:      Right Ear: Tympanic membrane normal.      Left Ear: Tympanic membrane normal.      Mouth/Throat:      Mouth: Mucous membranes are moist.   Eyes:      Pupils: Pupils are equal, round, and reactive to light.   Cardiovascular:      Rate and Rhythm: Normal rate and regular rhythm.      Heart sounds: Normal heart sounds.   Pulmonary:      Effort: Pulmonary effort is normal.      Breath sounds: Normal breath sounds.   Abdominal:      General: Abdomen is flat.      Palpations:  Abdomen is soft.   Musculoskeletal:      Cervical back: Normal range of motion and neck supple.      Right lower leg: No edema.      Left lower leg: No edema.      Comments: Movements of the lumbar spine causing some discomfort and pain   Skin:     General: Skin is warm and dry.   Neurological:      General: No focal deficit present.      Mental Status: She is alert and oriented to person, place, and time.   Psychiatric:         Mood and Affect: Mood normal.         Behavior: Behavior normal.         Lab Review   No visits with results within 2 Month(s) from this visit.   Latest known visit with results is:   Admission on 03/12/2023, Discharged on 03/12/2023   Component Date Value Ref Range Status    WBC 03/12/2023 10.31 (H)  4.31 - 10.16 Thousand/uL Final    RBC 03/12/2023 4.55  3.81 - 5.12 Million/uL Final    Hemoglobin 03/12/2023 13.7  11.5 - 15.4 g/dL Final    Hematocrit 03/12/2023 40.7  34.8 - 46.1 % Final    MCV 03/12/2023 90  82 - 98 fL Final    MCH 03/12/2023 30.1  26.8 - 34.3 pg Final    MCHC 03/12/2023 33.7  31.4 - 37.4 g/dL Final    RDW 03/12/2023 11.9  11.6 - 15.1 % Final    MPV 03/12/2023 11.0  8.9 - 12.7 fL Final    Platelets 03/12/2023 400 (H)  149 - 390 Thousands/uL Final    nRBC 03/12/2023 0  /100 WBCs Final    Segmented % 03/12/2023 85 (H)  43 - 75 % Final    Immature Grans % 03/12/2023 0  0 - 2 % Final    Lymphocytes % 03/12/2023 9 (L)  14 - 44 % Final    Monocytes % 03/12/2023 6  4 - 12 % Final    Eosinophils Relative 03/12/2023 0  0 - 6 % Final    Basophils Relative 03/12/2023 0  0 - 1 % Final    Absolute Neutrophils 03/12/2023 8.63 (H)  1.85 - 7.62 Thousands/µL Final    Absolute Immature Grans 03/12/2023 0.04  0.00 - 0.20 Thousand/uL Final    Absolute Lymphocytes 03/12/2023 0.96  0.60 - 4.47 Thousands/µL Final    Absolute Monocytes 03/12/2023 0.66  0.17 - 1.22 Thousand/µL Final    Eosinophils Absolute 03/12/2023 0.01  0.00 - 0.61 Thousand/µL Final    Basophils Absolute 03/12/2023 0.01  0.00 -  0.10 Thousands/µL Final    Sodium 03/12/2023 135  135 - 147 mmol/L Final    Potassium 03/12/2023 2.7 (LL)  3.5 - 5.3 mmol/L Final    Chloride 03/12/2023 94 (L)  96 - 108 mmol/L Final    CO2 03/12/2023 29  21 - 32 mmol/L Final    ANION GAP 03/12/2023 12  4 - 13 mmol/L Final    BUN 03/12/2023 5  5 - 25 mg/dL Final    Creatinine 03/12/2023 0.55 (L)  0.60 - 1.30 mg/dL Final    Standardized to IDMS reference method    Glucose 03/12/2023 184 (H)  65 - 140 mg/dL Final    If the patient is fasting, the ADA then defines impaired fasting glucose as > 100 mg/dL and diabetes as > or equal to 123 mg/dL.  Specimen collection should occur prior to Sulfasalazine administration due to the potential for falsely depressed results. Specimen collection should occur prior to Sulfapyridine administration due to the potential for falsely elevated results.    Calcium 03/12/2023 9.1  8.4 - 10.2 mg/dL Final    AST 03/12/2023 14  13 - 39 U/L Final    Specimen collection should occur prior to Sulfasalazine administration due to the potential for falsely depressed results.     ALT 03/12/2023 16  7 - 52 U/L Final    Specimen collection should occur prior to Sulfasalazine administration due to the potential for falsely depressed results.     Alkaline Phosphatase 03/12/2023 47  34 - 104 U/L Final    Total Protein 03/12/2023 7.7  6.4 - 8.4 g/dL Final    Albumin 03/12/2023 3.6  3.5 - 5.0 g/dL Final    Total Bilirubin 03/12/2023 0.55  0.20 - 1.00 mg/dL Final    eGFR 03/12/2023 105  ml/min/1.73sq m Final    SARS-CoV-2 03/12/2023 Negative  Negative Final    INFLUENZA A PCR 03/12/2023 Negative  Negative Final    INFLUENZA B PCR 03/12/2023 Negative  Negative Final    RSV PCR 03/12/2023 Negative  Negative Final    Amph/Meth UR 03/12/2023 Positive (A)  Negative Final    Barbiturate Ur 03/12/2023 Negative  Negative Final    Benzodiazepine Urine 03/12/2023 Negative  Negative Final    Cocaine Urine 03/12/2023 Negative  Negative Final    Methadone Urine  "03/12/2023 Negative  Negative Final    Opiate Urine 03/12/2023 Negative  Negative Final    PCP Ur 03/12/2023 Negative  Negative Final    THC Urine 03/12/2023 Negative  Negative Final    Oxycodone Urine 03/12/2023 Negative  Negative Final    Color, UA 03/12/2023 Yellow  Yellow Final    Clarity, UA 03/12/2023 Slightly Cloudy (A)  Clear Final    Specific Gravity, UA 03/12/2023 1.015  1.001 - 1.030 Final    pH, UA 03/12/2023 7.0  5.0, 5.5, 6.0, 6.5, 7.0, 7.5, 8.0 Final    Leukocytes, UA 03/12/2023 1+ (A)  Negative Final    Nitrite, UA 03/12/2023 Negative  Negative Final    Protein, UA 03/12/2023 Trace (A)  Negative, Interference- unable to analyze mg/dl Final    Glucose, UA 03/12/2023 Trace (A)  Negative mg/dl Final    Ketones, UA 03/12/2023 Trace (A)  Negative mg/dl Final    Urobilinogen, UA 03/12/2023 >=8.0 (A)  0.2, 1.0 E.U./dl E.U./dl Final    Bilirubin, UA 03/12/2023 1+ (A)  Negative Final    Occult Blood, UA 03/12/2023 1+ (A)  Negative Final    RBC, UA 03/12/2023 0-5  None Seen, 0-1, 1-2, 2-4, 0-5 /hpf Final    WBC, UA 03/12/2023 4-10 (A)  None Seen, 0-1, 1-2, 0-5, 2-4 /hpf Final    Epithelial Cells 03/12/2023 Innumerable (A)  None Seen, Occasional /hpf Final    Bacteria, UA 03/12/2023 Occasional  None Seen, Occasional /hpf Final    MUCUS THREADS 03/12/2023 Innumerable (A)  None Seen Final    URINE COMMENT 03/12/2023 Concentrated Microscopic on low volume urine.   Final    Ventricular Rate 03/12/2023 95  BPM Final    Atrial Rate 03/12/2023 95  BPM Final    ME Interval 03/12/2023 120  ms Final    QRSD Interval 03/12/2023 88  ms Final    QT Interval 03/12/2023 382  ms Final    QTC Interval 03/12/2023 480  ms Final    P Axis 03/12/2023 77  degrees Final    QRS Axis 03/12/2023 85  degrees Final    T Wave [place] 03/12/2023 53  degrees Final         Mana Peace MD        \"This note has been constructed using a voice recognition system.Therefore there may be syntax, spelling, and/or grammatical errors. Please call if " "you have any questions. \"  "

## 2024-12-07 ENCOUNTER — APPOINTMENT (EMERGENCY)
Dept: RADIOLOGY | Facility: HOSPITAL | Age: 59
End: 2024-12-07
Payer: MEDICARE

## 2024-12-07 ENCOUNTER — HOSPITAL ENCOUNTER (EMERGENCY)
Facility: HOSPITAL | Age: 59
Discharge: LEFT BEFORE DISCHARGE | End: 2024-12-07
Attending: EMERGENCY MEDICINE
Payer: MEDICARE

## 2024-12-07 ENCOUNTER — HOSPITAL ENCOUNTER (EMERGENCY)
Facility: HOSPITAL | Age: 59
Discharge: HOME | End: 2024-12-07
Attending: EMERGENCY MEDICINE | Admitting: EMERGENCY MEDICINE
Payer: MEDICARE

## 2024-12-07 VITALS
SYSTOLIC BLOOD PRESSURE: 168 MMHG | RESPIRATION RATE: 22 BRPM | DIASTOLIC BLOOD PRESSURE: 109 MMHG | OXYGEN SATURATION: 98 % | HEART RATE: 87 BPM | TEMPERATURE: 98.2 F

## 2024-12-07 VITALS
RESPIRATION RATE: 16 BRPM | TEMPERATURE: 98.3 F | HEART RATE: 79 BPM | OXYGEN SATURATION: 98 % | DIASTOLIC BLOOD PRESSURE: 56 MMHG | SYSTOLIC BLOOD PRESSURE: 119 MMHG

## 2024-12-07 DIAGNOSIS — R07.9 CHEST PAIN: Primary | ICD-10-CM

## 2024-12-07 DIAGNOSIS — R55 SYNCOPE: Primary | ICD-10-CM

## 2024-12-07 DIAGNOSIS — R93.0 ABNORMAL CT OF THE HEAD: ICD-10-CM

## 2024-12-07 DIAGNOSIS — R91.1 PULMONARY NODULE: ICD-10-CM
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
## 2024-12-07 LAB
ALBUMIN SERPL BCG-MCNC: 3.6 G/DL (ref 3.5–5)
ALBUMIN SERPL BCG-MCNC: 3.7 G/DL (ref 3.5–5)
ALP SERPL-CCNC: 53 U/L (ref 34–104)
ALP SERPL-CCNC: 55 U/L (ref 34–104)
ALT SERPL W P-5'-P-CCNC: 13 U/L (ref 7–52)
ALT SERPL W P-5'-P-CCNC: 13 U/L (ref 7–52)
AMPHETAMINES SERPL QL SCN: NEGATIVE
ANION GAP SERPL CALCULATED.3IONS-SCNC: 5 MMOL/L (ref 4–13)
ANION GAP SERPL CALCULATED.3IONS-SCNC: 7 MMOL/L (ref 4–13)
AST SERPL W P-5'-P-CCNC: 13 U/L (ref 13–39)
AST SERPL W P-5'-P-CCNC: 14 U/L (ref 13–39)
BARBITURATES UR QL: NEGATIVE
BASOPHILS # BLD AUTO: 0.03 THOUSANDS/ÂΜL (ref 0–0.1)
BASOPHILS # BLD AUTO: 0.05 THOUSANDS/ÂΜL (ref 0–0.1)
BASOPHILS NFR BLD AUTO: 0 % (ref 0–1)
BASOPHILS NFR BLD AUTO: 1 % (ref 0–1)
BENZODIAZ UR QL: NEGATIVE
BILIRUB SERPL-MCNC: 0.27 MG/DL (ref 0.2–1)
BILIRUB SERPL-MCNC: 0.37 MG/DL (ref 0.2–1)
BUN SERPL-MCNC: 13 MG/DL (ref 5–25)
BUN SERPL-MCNC: 13 MG/DL (ref 5–25)
CALCIUM SERPL-MCNC: 9 MG/DL (ref 8.4–10.2)
CALCIUM SERPL-MCNC: 9 MG/DL (ref 8.4–10.2)
CARDIAC TROPONIN I PNL SERPL HS: <2 NG/L (ref ?–50)
CARDIAC TROPONIN I PNL SERPL HS: <2 NG/L (ref ?–50)
CHLORIDE SERPL-SCNC: 101 MMOL/L (ref 96–108)
CHLORIDE SERPL-SCNC: 106 MMOL/L (ref 96–108)
CO2 SERPL-SCNC: 26 MMOL/L (ref 21–32)
CO2 SERPL-SCNC: 31 MMOL/L (ref 21–32)
COCAINE UR QL: POSITIVE
CREAT SERPL-MCNC: 0.61 MG/DL (ref 0.6–1.3)
CREAT SERPL-MCNC: 1.06 MG/DL (ref 0.6–1.3)
EOSINOPHIL # BLD AUTO: 0.17 THOUSAND/ÂΜL (ref 0–0.61)
EOSINOPHIL # BLD AUTO: 0.28 THOUSAND/ÂΜL (ref 0–0.61)
EOSINOPHIL NFR BLD AUTO: 2 % (ref 0–6)
EOSINOPHIL NFR BLD AUTO: 4 % (ref 0–6)
ERYTHROCYTE [DISTWIDTH] IN BLOOD BY AUTOMATED COUNT: 12.2 % (ref 11.6–15.1)
ERYTHROCYTE [DISTWIDTH] IN BLOOD BY AUTOMATED COUNT: 12.2 % (ref 11.6–15.1)
FENTANYL UR QL SCN: POSITIVE
GFR SERPL CREATININE-BSD FRML MDRD: 100 ML/MIN/1.73SQ M
GFR SERPL CREATININE-BSD FRML MDRD: 58 ML/MIN/1.73SQ M
GLUCOSE SERPL-MCNC: 124 MG/DL (ref 65–140)
GLUCOSE SERPL-MCNC: 93 MG/DL (ref 65–140)
HCT VFR BLD AUTO: 35.5 % (ref 34.8–46.1)
HCT VFR BLD AUTO: 36.7 % (ref 34.8–46.1)
HGB BLD-MCNC: 11.9 G/DL (ref 11.5–15.4)
HGB BLD-MCNC: 12.3 G/DL (ref 11.5–15.4)
HYDROCODONE UR QL SCN: NEGATIVE
IMM GRANULOCYTES # BLD AUTO: 0.02 THOUSAND/UL (ref 0–0.2)
IMM GRANULOCYTES # BLD AUTO: 0.03 THOUSAND/UL (ref 0–0.2)
IMM GRANULOCYTES NFR BLD AUTO: 0 % (ref 0–2)
IMM GRANULOCYTES NFR BLD AUTO: 0 % (ref 0–2)
LYMPHOCYTES # BLD AUTO: 1.33 THOUSANDS/ÂΜL (ref 0.6–4.47)
LYMPHOCYTES # BLD AUTO: 2.31 THOUSANDS/ÂΜL (ref 0.6–4.47)
LYMPHOCYTES NFR BLD AUTO: 18 % (ref 14–44)
LYMPHOCYTES NFR BLD AUTO: 30 % (ref 14–44)
MAGNESIUM SERPL-MCNC: 2 MG/DL (ref 1.9–2.7)
MCH RBC QN AUTO: 30.3 PG (ref 26.8–34.3)
MCH RBC QN AUTO: 30.3 PG (ref 26.8–34.3)
MCHC RBC AUTO-ENTMCNC: 33.5 G/DL (ref 31.4–37.4)
MCHC RBC AUTO-ENTMCNC: 33.5 G/DL (ref 31.4–37.4)
MCV RBC AUTO: 90 FL (ref 82–98)
MCV RBC AUTO: 90 FL (ref 82–98)
METHADONE UR QL: POSITIVE
MONOCYTES # BLD AUTO: 0.42 THOUSAND/ÂΜL (ref 0.17–1.22)
MONOCYTES # BLD AUTO: 0.79 THOUSAND/ÂΜL (ref 0.17–1.22)
MONOCYTES NFR BLD AUTO: 10 % (ref 4–12)
MONOCYTES NFR BLD AUTO: 6 % (ref 4–12)
NEUTROPHILS # BLD AUTO: 4.21 THOUSANDS/ÂΜL (ref 1.85–7.62)
NEUTROPHILS # BLD AUTO: 5.38 THOUSANDS/ÂΜL (ref 1.85–7.62)
NEUTS SEG NFR BLD AUTO: 55 % (ref 43–75)
NEUTS SEG NFR BLD AUTO: 74 % (ref 43–75)
NRBC BLD AUTO-RTO: 0 /100 WBCS
NRBC BLD AUTO-RTO: 0 /100 WBCS
OPIATES UR QL SCN: NEGATIVE
OXYCODONE+OXYMORPHONE UR QL SCN: NEGATIVE
PCP UR QL: NEGATIVE
PLATELET # BLD AUTO: 348 THOUSANDS/UL (ref 149–390)
PLATELET # BLD AUTO: 359 THOUSANDS/UL (ref 149–390)
PMV BLD AUTO: 10 FL (ref 8.9–12.7)
PMV BLD AUTO: 9.8 FL (ref 8.9–12.7)
POTASSIUM SERPL-SCNC: 3.9 MMOL/L (ref 3.5–5.3)
POTASSIUM SERPL-SCNC: 4.3 MMOL/L (ref 3.5–5.3)
PROT SERPL-MCNC: 6.5 G/DL (ref 6.4–8.4)
PROT SERPL-MCNC: 6.8 G/DL (ref 6.4–8.4)
RBC # BLD AUTO: 3.93 MILLION/UL (ref 3.81–5.12)
RBC # BLD AUTO: 4.06 MILLION/UL (ref 3.81–5.12)
SODIUM SERPL-SCNC: 137 MMOL/L (ref 135–147)
SODIUM SERPL-SCNC: 139 MMOL/L (ref 135–147)
THC UR QL: NEGATIVE
WBC # BLD AUTO: 7.35 THOUSAND/UL (ref 4.31–10.16)
WBC # BLD AUTO: 7.67 THOUSAND/UL (ref 4.31–10.16)

## 2024-12-07 PROCEDURE — 80053 COMPREHEN METABOLIC PANEL: CPT | Performed by: EMERGENCY MEDICINE

## 2024-12-07 PROCEDURE — 96374 THER/PROPH/DIAG INJ IV PUSH: CPT

## 2024-12-07 PROCEDURE — 71250 CT THORAX DX C-: CPT

## 2024-12-07 PROCEDURE — 99285 EMERGENCY DEPT VISIT HI MDM: CPT

## 2024-12-07 PROCEDURE — 84484 ASSAY OF TROPONIN QUANT: CPT | Performed by: EMERGENCY MEDICINE

## 2024-12-07 PROCEDURE — 93005 ELECTROCARDIOGRAM TRACING: CPT

## 2024-12-07 PROCEDURE — 85025 COMPLETE CBC W/AUTO DIFF WBC: CPT | Performed by: EMERGENCY MEDICINE

## 2024-12-07 PROCEDURE — 83735 ASSAY OF MAGNESIUM: CPT | Performed by: EMERGENCY MEDICINE

## 2024-12-07 PROCEDURE — 80307 DRUG TEST PRSMV CHEM ANLYZR: CPT | Performed by: EMERGENCY MEDICINE

## 2024-12-07 PROCEDURE — 70450 CT HEAD/BRAIN W/O DYE: CPT

## 2024-12-07 PROCEDURE — 96375 TX/PRO/DX INJ NEW DRUG ADDON: CPT

## 2024-12-07 PROCEDURE — 99285 EMERGENCY DEPT VISIT HI MDM: CPT | Performed by: EMERGENCY MEDICINE

## 2024-12-07 PROCEDURE — 96361 HYDRATE IV INFUSION ADD-ON: CPT

## 2024-12-07 PROCEDURE — 36415 COLL VENOUS BLD VENIPUNCTURE: CPT | Performed by: EMERGENCY MEDICINE

## 2024-12-07 PROCEDURE — 99284 EMERGENCY DEPT VISIT MOD MDM: CPT | Performed by: EMERGENCY MEDICINE

## 2024-12-07 RX ORDER — KETOROLAC TROMETHAMINE 30 MG/ML
15 INJECTION, SOLUTION INTRAMUSCULAR; INTRAVENOUS ONCE
Status: COMPLETED | OUTPATIENT
Start: 2024-12-07 | End: 2024-12-07

## 2024-12-07 RX ORDER — ACETAMINOPHEN 10 MG/ML
1000 INJECTION, SOLUTION INTRAVENOUS ONCE
Status: COMPLETED | OUTPATIENT
Start: 2024-12-07 | End: 2024-12-07

## 2024-12-07 RX ADMIN — KETOROLAC TROMETHAMINE 15 MG: 30 INJECTION, SOLUTION INTRAMUSCULAR; INTRAVENOUS at 03:42

## 2024-12-07 RX ADMIN — ACETAMINOPHEN 1000 MG: 10 INJECTION INTRAVENOUS at 03:42

## 2024-12-07 RX ADMIN — SODIUM CHLORIDE 1000 ML: 0.9 INJECTION, SOLUTION INTRAVENOUS at 03:37

## 2024-12-07 NOTE — ED PROVIDER NOTES
Attending Only Time reflects when diagnosis was documented in both MDM as applicable and the Disposition within this note       Time User Action Codes Description Comment    12/7/2024  4:59 AM Romaine Castrejon [R93.0] Abnormal CT of the head     12/7/2024  4:59 AM Romaine Castrejon Modify [R93.0] Abnormal CT of the head   Sinonasal mucosal disease as described, with hyperdense material in the right maxillary sinus and associated osseous erosive change    12/7/2024  5:04 AM Romaine Castrejon [R91.1] Pulmonary nodule     12/7/2024  5:04 AM Romaine Castrejon Add [W19.XXXA] Fall, initial encounter     12/7/2024  5:04 AM Romaine Castrejon [R55] Syncope     12/7/2024  5:09 AM Romaine Castrejon Modify [R93.0] Abnormal CT of the head   Sinonasal mucosal disease as described, with hyperdense material in the right maxillary sinus and associated osseous erosive change    12/7/2024  5:09 AM Romaine Castrejon Modify [R55] Syncope           ED Disposition       ED Disposition   Discharge    Condition   Stable    Date/Time   Sat Dec 7, 2024  5:09 AM    Comment   Radha Booker discharge to home/self care.                   Assessment & Plan       Medical Decision Making  59-year-old female presenting to the ED today for chest pain.  Differential at this time includes ACS versus pneumonia versus pneumothorax versus rib fracture versus musculoskeletal chest wall pain.  Will evaluate the CBC, CMP, troponin, twelve-lead EKG, CT chest without contrast.  In terms of her syncope we will get the above labs as well.  Likely vasovagal in nature.  Will be getting Trope and EKG to evaluate for cardiogenic causes.  Will give normal saline bolus in the event that this may have been orthostatic after shower.  If workup otherwise negative we will plan to discharge home.  Will get a CT head given the syncope and fall with head strike spanking clear clinically.    Findings of head CT as well as CT chest were discussed with patient encouraging her to call her primary care doctor  to make an appointment for pulmonary nodule as well as with this chronic debris in her sinuses which she will need ENT follow-up for.  Ambulatory referral placed for ENT.  Given her information to call as well.  Return precautions discussed and patient was discharged home.    Amount and/or Complexity of Data Reviewed  Labs: ordered. Decision-making details documented in ED Course.  Radiology: ordered.    Risk  Prescription drug management.        ED Course as of 12/07/24 0511   Sat Dec 07, 2024   0349 Blood Pressure: 124/59  Improved with no intervention. Patient likely anxious on her arrival causing slight elevation in BP   0349 SpO2: 100 %  Normal   0410 Comprehensive metabolic panel  WNL   0410 CBC and differential  WNL       Medications   sodium chloride 0.9 % bolus 1,000 mL (1,000 mL Intravenous New Bag 12/7/24 0337)   ketorolac (TORADOL) injection 15 mg (15 mg Intravenous Given 12/7/24 0342)   acetaminophen (Ofirmev) injection 1,000 mg (0 mg Intravenous Stopped 12/7/24 0357)       ED Risk Strat Scores                                               History of Present Illness       Chief Complaint   Patient presents with    Fall     Pt was taking a shower  and states she woke up and lowered herself onto  on the shower floor.pt states felt completley fine prior. No dizziness  unsure if she lost consciousness. States her equilibrium has been off    Chest Pain     Chest pain starting  2:00 AM. Pt states that it feels like pressure in her chest no radiation        Past Medical History:   Diagnosis Date    Chronic pain     neck    Chronic, continuous use of opioids 12/27/2021    COVID-19 12/27/2021    Facial abscess 12/27/2021    Mandibular abscess 12/27/2021      Past Surgical History:   Procedure Laterality Date    CERVICAL FUSION      NECK SURGERY        History reviewed. No pertinent family history.   Social History     Tobacco Use    Smoking status: Every Day     Current packs/day: 0.50     Types: Cigarettes     Smokeless tobacco: Never   Vaping Use    Vaping status: Never Used   Substance Use Topics    Alcohol use: No    Drug use: No      E-Cigarette/Vaping    E-Cigarette Use Never User       E-Cigarette/Vaping Substances      I have reviewed and agree with the history as documented.     59-year-old female presenting to the ED today for syncope and chest pain.  Patient states that she was in the shower and then as she was getting out she woke up on the floor.  She is not sure if she hit her head on the way down but she is complaining of pain in the head to observation bed and her head especially she is pointing towards the front of her head.  She also was complaining of chest pain across both sides of her chest which she is describing as a sharp.  No nausea or vomiting.  Says that her equilibrium is has been off as she is being treated for an ear infection.  No fevers or chills.  No shortness of breath.  History of COPD and says she smokes but no other drug use or recreational drug use she says.        Review of Systems   Constitutional:  Negative for chills and fever.   HENT:  Negative for hearing loss.    Eyes:  Negative for visual disturbance.   Respiratory:  Negative for shortness of breath.    Cardiovascular:  Positive for chest pain.   Gastrointestinal:  Negative for abdominal pain, constipation, diarrhea, nausea and vomiting.   Genitourinary:  Negative for difficulty urinating.   Musculoskeletal:  Negative for myalgias.   Skin:  Negative for color change.   Neurological:  Positive for syncope. Negative for dizziness and headaches.   Psychiatric/Behavioral:  Negative for agitation.    All other systems reviewed and are negative.          Objective       ED Triage Vitals   Temperature Pulse Blood Pressure Respirations SpO2 Patient Position - Orthostatic VS   12/07/24 0322 12/07/24 0322 12/07/24 0324 12/07/24 0322 12/07/24 0322 --   98.3 °F (36.8 °C) 95 (!) 145/103 18 100 %       Temp Source Heart Rate Source BP  Location FiO2 (%) Pain Score    12/07/24 0322 12/07/24 0322 -- -- 12/07/24 0322    Oral Monitor   6      Vitals      Date and Time Temp Pulse SpO2 Resp BP Pain Score FACES Pain Rating User   12/07/24 0345 -- 80 97 % 18 124/59 -- --    12/07/24 0342 -- -- -- -- -- 10 - Worst Possible Pain --    12/07/24 0324 -- -- -- -- 145/103 -- -- CAC   12/07/24 0322 98.3 °F (36.8 °C) 95 100 % 18 -- 6 -- CAC            Physical Exam  Vitals and nursing note reviewed.   Constitutional:       General: She is not in acute distress.     Appearance: Normal appearance. She is well-developed. She is not ill-appearing.   HENT:      Head: Normocephalic and atraumatic.      Right Ear: External ear normal.      Left Ear: External ear normal.      Nose: Nose normal.      Mouth/Throat:      Mouth: Mucous membranes are moist.      Pharynx: Oropharynx is clear. No oropharyngeal exudate.   Eyes:      General:         Right eye: No discharge.         Left eye: No discharge.      Extraocular Movements: Extraocular movements intact.      Conjunctiva/sclera: Conjunctivae normal.      Pupils: Pupils are equal, round, and reactive to light.   Cardiovascular:      Rate and Rhythm: Normal rate and regular rhythm.      Heart sounds: Normal heart sounds. No murmur heard.     No friction rub. No gallop.   Pulmonary:      Effort: Pulmonary effort is normal. No respiratory distress.      Breath sounds: Normal breath sounds. No stridor. No wheezing.   Abdominal:      General: Bowel sounds are normal. There is no distension.      Palpations: Abdomen is soft.      Tenderness: There is no abdominal tenderness.   Musculoskeletal:         General: No swelling. Normal range of motion.      Cervical back: Normal range of motion and neck supple. No rigidity.   Skin:     General: Skin is warm and dry.      Capillary Refill: Capillary refill takes less than 2 seconds.   Neurological:      General: No focal deficit present.      Mental Status: She is alert and  oriented to person, place, and time. Mental status is at baseline.   Psychiatric:         Mood and Affect: Mood normal.         Behavior: Behavior normal.         Results Reviewed       Procedure Component Value Units Date/Time    HS Troponin 0hr (reflex protocol) [[id number]]  (Normal) Collected: 12/07/24 0338    Lab Status: Final result Specimen: Blood from Arm, Right Updated: 12/07/24 0411     hs TnI 0hr <2 ng/L     Comprehensive metabolic panel [[id number]] Collected: 12/07/24 0338    Lab Status: Final result Specimen: Blood from Arm, Right Updated: 12/07/24 0407     Sodium 137 mmol/L      Potassium 4.3 mmol/L      Chloride 101 mmol/L      CO2 31 mmol/L      ANION GAP 5 mmol/L      BUN 13 mg/dL      Creatinine 0.61 mg/dL      Glucose 93 mg/dL      Calcium 9.0 mg/dL      AST 14 U/L      ALT 13 U/L      Alkaline Phosphatase 55 U/L      Total Protein 6.5 g/dL      Albumin 3.7 g/dL      Total Bilirubin 0.37 mg/dL      eGFR 100 ml/min/1.73sq m     Narrative:      National Kid[employer] Disease Foundation guidelines for Chronic Kidney Disease (CKD):     Stage 1 with normal or high GFR (GFR > 90 mL/min/1.73 square meters)    Stage 2 Mild CKD (GFR = 60-89 mL/min/1.73 square meters)    Stage 3A Moderate CKD (GFR = 45-59 mL/min/1.73 square meters)    Stage 3B Moderate CKD (GFR = 30-44 mL/min/1.73 square meters)    Stage 4 Severe CKD (GFR = 15-29 mL/min/1.73 square meters)    Stage 5 End Stage CKD (GFR <15 mL/min/1.73 square meters)  Note: GFR calculation is accurate only with a steady state creatinine    CBC and differential [[id number]] Collected: 12/07/24 0338    Lab Status: Final result Specimen: Blood from Arm, Right Updated: 12/07/24 0344     WBC 7.67 Thousand/uL      RBC 3.93 Million/uL      Hemoglobin 11.9 g/dL      Hematocrit 35.5 %      MCV 90 fL      MCH 30.3 pg      MCHC 33.5 g/dL      RDW 12.2 %      MPV 9.8 fL      Platelets 348 Thousands/uL      nRBC 0 /100 WBCs      Segmented % 55 %      Immature Grans % 0 %       Lymphocytes % 30 %      Monocytes % 10 %      Eosinophils Relative 4 %      Basophils Relative 1 %      Absolute Neutrophils 4.21 Thousands/µL      Absolute Immature Grans 0.03 Thousand/uL      Absolute Lymphocytes 2.31 Thousands/µL      Absolute Monocytes 0.79 Thousand/µL      Eosinophils Absolute 0.28 Thousand/µL      Basophils Absolute 0.05 Thousands/µL             CT chest without contrast   Final Interpretation by Chon Kaur MD (12/07 2947)      1.  No acute findings in the chest.   2.  Right lower lobe nodule measuring 9 mm, indeterminate in etiology. Based on current Fleischner Society 2017 Guidelines on incidental pulmonary nodule, either PET/CT scan evaluation, tissue sampling or short-term interval follow-up non-contrast CT    follow-up (initially in 3 months) may be considered appropriate.   3.  Multiple subcentimeter groundglass nodules. These may also be further evaluated on follow-up imaging.   4.  Smoking-related lung disease including mild emphysema and respiratory bronchiolitis.   5.  Sequelae of chronic pancreatitis.         The study was marked in EPIC for immediate notification.         Workstation performed: [id number]         CT head without contrast   Final Interpretation by Chon Kaur MD (12/07 9452)      1.  No acute intracranial abnormality.   2.  Sinonasal mucosal disease as described, with hyperdense material in the right maxillary sinus and associated osseous erosive change. Differential considerations include chronic sinusitis including fungal etiologies versus less likely underlying    sinonasal neoplasm. Recommend ENT consultation for further evaluation.   3.  Bilateral mastoid effusions.         The study was marked in EPIC for immediate notification.         Workstation performed: [id number]             ECG 12 Lead Documentation Only    Date/Time: 12/7/2024 3:28 AM    Performed by: Romaine Castrejon MD  Authorized by: Romaine Castrejon MD    Indications / Diagnosis:  Chest pain  ECG  reviewed by me, the ED Provider: yes    Patient location:  ED  Previous ECG:     Previous ECG:  Unavailable    Comparison to cardiac monitor: No    Interpretation:     Interpretation: normal    Rate:     ECG rate:  Chest pain    ECG rate assessment: normal    Rhythm:     Rhythm: sinus rhythm    Ectopy:     Ectopy: none    QRS:     QRS axis:  Normal    QRS intervals:  Normal  Conduction:     Conduction: normal    ST segments:     ST segments:  Normal  T waves:     T waves: normal        ED Medication and Procedure Management   Prior to Admission Medications   Prescriptions Last Dose Informant Patient Reported? Taking?   amoxicillin-clavulanate (AUGMENTIN) 875-125 mg per tablet   No No   Sig: Take 1 tablet by mouth every 12 (twelve) hours for 7 days   baclofen 10 mg tablet   No No   Sig: Take 0.5 tablets (5 mg total) by mouth 3 (three) times a day   lidocaine (Lidoderm) 5 %   No No   Sig: Apply 1 patch topically over 12 hours daily Remove & Discard patch within 12 hours or as directed by MD   nicotine (NICODERM CQ) 7 mg/24hr TD 24 hr patch   No No   Sig: Place 1 patch on the skin over 24 hours every 24 hours   potassium chloride (Klor-Con M20) 20 mEq tablet   No No   Sig: Take 1 tablet (20 mEq total) by mouth daily      Facility-Administered Medications: None     Patient's Medications   Discharge Prescriptions    No medications on file       ED SEPSIS DOCUMENTATION   Time reflects when diagnosis was documented in both MDM as applicable and the Disposition within this note       Time User Action Codes Description Comment    12/7/2024  4:59 AM Romaine Castrejon Add [R93.0] Abnormal CT of the head     12/7/2024  4:59 AM Romaine Castrejon Modify [R93.0] Abnormal CT of the head   Sinonasal mucosal disease as described, with hyperdense material in the right maxillary sinus and associated osseous erosive change    12/7/2024  5:04 AM Romaine Castrejon Add [R91.1] Pulmonary nodule     12/7/2024  5:04 AM Romaine Castrejon Add [W19.XXXA] Fall,  initial encounter     12/7/2024  5:04 AM Romaine Castrejon Add [R55] Syncope     12/7/2024  5:09 AM Romaine Castrejon Modify [R93.0] Abnormal CT of the head   Sinonasal mucosal disease as described, with hyperdense material in the right maxillary sinus and associated osseous erosive change    12/7/2024  5:09 AM Romaine Castrejon Modify [R55] Syncope                  Romaine Castrejon MD  12/07/24 0511

## 2024-12-07 NOTE — ED PROVIDER NOTES
Time reflects when diagnosis was documented in both MDM as applicable and the Disposition within this note       Time User Action Codes Description Comment    12/7/2024 11:31 PM Archana Chowdhury Add [R07.9] Chest pain           ED Disposition       ED Disposition   Left from Room after Provider Exam    Condition   --    Date/Time   Sat Dec 7, 2024  8:07 PM    Comment   --             Assessment & Plan       Medical Decision Making  Patient initially evaluated with EKG and labs however by the time her evaluation was completed she left the ER walked out eloped.    Amount and/or Complexity of Data Reviewed  External Data Reviewed: notes.  Labs: ordered. Decision-making details documented in ED Course.  ECG/medicine tests: ordered and independent interpretation performed. Decision-making details documented in ED Course.             Medications - No data to display    ED Risk Strat Scores   HEART Risk Score      Flowsheet Row Most Recent Value   Heart Score Risk Calculator    History 0 Filed at: 12/07/2024 2330   ECG 1 Filed at: 12/07/2024 2330   Age 1 Filed at: 12/07/2024 2330   Risk Factors 0 Filed at: 12/07/2024 2330   Troponin 0 Filed at: 12/07/2024 2330   HEART Score 2 Filed at: 12/07/2024 2330                               SBIRT 22yo+      Flowsheet Row Most Recent Value   Initial Alcohol Screen: US AUDIT-C     1. How often do you have a drink containing alcohol? 1 Filed at: 12/07/2024 1748   2. How many drinks containing alcohol do you have on a typical day you are drinking?  0 Filed at: 12/07/2024 1748   3b. FEMALE Any Age, or MALE 65+: How often do you have 4 or more drinks on one occassion? 0 Filed at: 12/07/2024 1748   Audit-C Score 1 Filed at: 12/07/2024 1748   BENJAMÍN: How many times in the past year have you...    Used an illegal drug or used a prescription medication for non-medical reasons? Never Filed at: 12/07/2024 1748                            History of Present Illness       Chief Complaint   Patient  presents with    Chest Pain     Pt reports of cp started after taking a muscle relaxer about an hour ago. Pt fell yest and was here       Past Medical History:   Diagnosis Date    Chronic pain     neck    Chronic, continuous use of opioids 12/27/2021    COVID-19 12/27/2021    Facial abscess 12/27/2021    Mandibular abscess 12/27/2021      Past Surgical History:   Procedure Laterality Date    CERVICAL FUSION      NECK SURGERY        History reviewed. No pertinent family history.   Social History     Tobacco Use    Smoking status: Every Day     Current packs/day: 0.50     Types: Cigarettes    Smokeless tobacco: Never   Vaping Use    Vaping status: Never Used   Substance Use Topics    Alcohol use: No    Drug use: No      E-Cigarette/Vaping    E-Cigarette Use Never User       E-Cigarette/Vaping Substances      I have reviewed and agree with the history as documented.     59-year-old female presents with some chest pressure she was seen here last night had a full evaluation for the same complaint was prescribed Flexeril for musculoskeletal pain she says she took it and then she flopped around like a fish was scared so she called BLS.  Currently her symptoms have resolved      History provided by:  Patient   used: No        Review of Systems   Constitutional: Negative.    HENT: Negative.     Eyes: Negative.    Respiratory: Negative.     Cardiovascular:  Positive for chest pain.   Gastrointestinal: Negative.    Endocrine: Negative.    Genitourinary: Negative.    Musculoskeletal: Negative.    Skin: Negative.    Allergic/Immunologic: Negative.    Neurological: Negative.    Hematological: Negative.    Psychiatric/Behavioral: Negative.     All other systems reviewed and are negative.          Objective       ED Triage Vitals   Temperature Pulse Blood Pressure Respirations SpO2 Patient Position - Orthostatic VS   12/07/24 1741 12/07/24 1741 12/07/24 1741 12/07/24 1741 12/07/24 1741 12/07/24 1741   98.2 °F  (36.8 °C) 89 154/73 22 98 % Sitting      Temp Source Heart Rate Source BP Location FiO2 (%) Pain Score    12/07/24 1741 12/07/24 1741 12/07/24 1741 -- 12/07/24 1744    Oral Monitor Right arm  10 - Worst Possible Pain      Vitals      Date and Time Temp Pulse SpO2 Resp BP Pain Score FACES Pain Rating User   12/07/24 1745 -- 87 98 % 22 168/109 -- -- AG   12/07/24 1744 -- -- -- -- -- 10 - Worst Possible Pain -- LEOLA   12/07/24 1741 98.2 °F (36.8 °C) 89 98 % 22 154/73 -- -- ATG            Physical Exam  Vitals and nursing note reviewed.   Constitutional:       Appearance: Normal appearance.   HENT:      Head: Normocephalic and atraumatic.      Nose: Nose normal.      Mouth/Throat:      Mouth: Mucous membranes are moist.   Eyes:      Extraocular Movements: Extraocular movements intact.      Pupils: Pupils are equal, round, and reactive to light.   Cardiovascular:      Rate and Rhythm: Normal rate and regular rhythm.   Pulmonary:      Effort: Pulmonary effort is normal.      Breath sounds: Normal breath sounds.   Abdominal:      General: Abdomen is flat. Bowel sounds are normal.      Palpations: Abdomen is soft.   Musculoskeletal:         General: Normal range of motion.      Cervical back: Normal range of motion and neck supple.   Skin:     General: Skin is warm.      Capillary Refill: Capillary refill takes less than 2 seconds.   Neurological:      General: No focal deficit present.      Mental Status: She is alert and oriented to person, place, and time. Mental status is at baseline.   Psychiatric:         Mood and Affect: Mood normal.         Thought Content: Thought content normal.         Results Reviewed       Procedure Component Value Units Date/Time    Comprehensive metabolic panel [[id number]] Collected: 12/07/24 1747    Lab Status: Final result Specimen: Blood from Arm, Left Updated: 12/07/24 1927     Sodium 139 mmol/L      Potassium 3.9 mmol/L      Chloride 106 mmol/L      CO2 26 mmol/L      ANION GAP 7 mmol/L       BUN 13 mg/dL      Creatinine 1.06 mg/dL      Glucose 124 mg/dL      Calcium 9.0 mg/dL      AST 13 U/L      ALT 13 U/L      Alkaline Phosphatase 53 U/L      Total Protein 6.8 g/dL      Albumin 3.6 g/dL      Total Bilirubin 0.27 mg/dL      eGFR 58 ml/min/1.73sq m     Narrative:      [employer] guidelines for Chronic Kidney Disease (CKD):     Stage 1 with normal or high GFR (GFR > 90 mL/min/1.73 square meters)    Stage 2 Mild CKD (GFR = 60-89 mL/min/1.73 square meters)    Stage 3A Moderate CKD (GFR = 45-59 mL/min/1.73 square meters)    Stage 3B Moderate CKD (GFR = 30-44 mL/min/1.73 square meters)    Stage 4 Severe CKD (GFR = 15-29 mL/min/1.73 square meters)    Stage 5 End Stage CKD (GFR <15 mL/min/1.73 square meters)  Note: GFR calculation is accurate only with a steady state creatinine    Magnesium [[id number]]  (Normal) Collected: 12/07/24 1747    Lab Status: Final result Specimen: Blood from Arm, Left Updated: 12/07/24 1919     Magnesium 2.0 mg/dL     HS Troponin 0hr (reflex protocol) [[id number]]  (Normal) Collected: 12/07/24 1747    Lab Status: Final result Specimen: Blood from Arm, Left Updated: 12/07/24 1835     hs TnI 0hr <2 ng/L     Rapid drug screen, urine [[id number]]  (Abnormal) Collected: 12/07/24 1751    Lab Status: Final result Specimen: Urine, Clean Catch Updated: 12/07/24 1827     Amph/Meth UR Negative     Barbiturate Ur Negative     Benzodiazepine Urine Negative     Cocaine Urine Positive     Methadone Urine Positive     Opiate Urine Negative     PCP Ur Negative     THC Urine Negative     Oxycodone Urine Negative     Fentanyl Urine Positive     HYDROCODONE URINE Negative    Narrative:      Presumptive report. If requested, specimen will be sent to reference lab for confirmation.  FOR MEDICAL PURPOSES ONLY.   IF CONFIRMATION NEEDED PLEASE CONTACT THE LAB WITHIN 5 DAYS.    Drug Screen Cutoff Levels:  AMPHETAMINE/METHAMPHETAMINES  1000 ng/mL  BARBITURATES     200  ng/mL  BENZODIAZEPINES     200 ng/mL  COCAINE      300 ng/mL  METHADONE      300 ng/mL  OPIATES      300 ng/mL  PHENCYCLIDINE     25 ng/mL  THC       50 ng/mL  OXYCODONE      100 ng/mL  FENTANYL      5 ng/mL  HYDROCODONE     300 ng/mL    CBC and differential [[id number]] Collected: 12/07/24 1747    Lab Status: Final result Specimen: Blood from Arm, Left Updated: 12/07/24 1758     WBC 7.35 Thousand/uL      RBC 4.06 Million/uL      Hemoglobin 12.3 g/dL      Hematocrit 36.7 %      MCV 90 fL      MCH 30.3 pg      MCHC 33.5 g/dL      RDW 12.2 %      MPV 10.0 fL      Platelets 359 Thousands/uL      nRBC 0 /100 WBCs      Segmented % 74 %      Immature Grans % 0 %      Lymphocytes % 18 %      Monocytes % 6 %      Eosinophils Relative 2 %      Basophils Relative 0 %      Absolute Neutrophils 5.38 Thousands/µL      Absolute Immature Grans 0.02 Thousand/uL      Absolute Lymphocytes 1.33 Thousands/µL      Absolute Monocytes 0.42 Thousand/µL      Eosinophils Absolute 0.17 Thousand/µL      Basophils Absolute 0.03 Thousands/µL             No orders to display       ECG 12 Lead Documentation Only    Date/Time: 12/7/2024 11:30 PM    Performed by: Archana Chowdhury DO  Authorized by: Archana Chowdhury DO    ECG reviewed by me, the ED Provider: yes    Patient location:  ED  Previous ECG:     Previous ECG:  Unavailable    Comparison to cardiac monitor: Yes    Interpretation:     Interpretation: non-specific    Rate:     ECG rate assessment: normal    Rhythm:     Rhythm: sinus rhythm    Ectopy:     Ectopy: none    QRS:     QRS axis:  Normal  Conduction:     Conduction: normal    ST segments:     ST segments:  Non-specific  T waves:     T waves: non-specific        ED Medication and Procedure Management   Prior to Admission Medications   Prescriptions Last Dose Informant Patient Reported? Taking?   amoxicillin-clavulanate (AUGMENTIN) 875-125 mg per tablet   No No   Sig: Take 1 tablet by mouth every 12 (twelve) hours for 7 days   baclofen 10 mg  tablet   No No   Sig: Take 0.5 tablets (5 mg total) by mouth 3 (three) times a day   lidocaine (Lidoderm) 5 %   No No   Sig: Apply 1 patch topically over 12 hours daily Remove & Discard patch within 12 hours or as directed by MD   nicotine (NICODERM CQ) 7 mg/24hr TD 24 hr patch   No No   Sig: Place 1 patch on the skin over 24 hours every 24 hours   potassium chloride (Klor-Con M20) 20 mEq tablet   No No   Sig: Take 1 tablet (20 mEq total) by mouth daily      Facility-Administered Medications: None     Discharge Medication List as of 12/7/2024  8:10 PM        CONTINUE these medications which have NOT CHANGED    Details   amoxicillin-clavulanate (AUGMENTIN) 875-125 mg per tablet Take 1 tablet by mouth every 12 (twelve) hours for 7 days, Starting Sun 12/1/2024, Until Sun 12/8/2024, Normal      baclofen 10 mg tablet Take 0.5 tablets (5 mg total) by mouth 3 (three) times a day, Starting Thu 12/5/2024, Normal      lidocaine (Lidoderm) 5 % Apply 1 patch topically over 12 hours daily Remove & Discard patch within 12 hours or as directed by MD, Starting Thu 12/5/2024, Normal      nicotine (NICODERM CQ) 7 mg/24hr TD 24 hr patch Place 1 patch on the skin over 24 hours every 24 hours, Starting Thu 12/5/2024, Normal      potassium chloride (Klor-Con M20) 20 mEq tablet Take 1 tablet (20 mEq total) by mouth daily, Starting Thu 12/5/2024, Normal           No discharge procedures on file.  ED SEPSIS DOCUMENTATION   Time reflects when diagnosis was documented in both MDM as applicable and the Disposition within this note       Time User Action Codes Description Comment    12/7/2024 11:31 PM Archana Chowdhury [R07.9] Chest pain                  Archana Chowdhury DO  12/07/24 [id number]

## 2024-12-07 NOTE — DISCHARGE INSTRUCTIONS
At this time your lab workup and EKG were otherwise unremarkable.    On your imaging found to have some debris in your sinuses which could be chronic sinusitis however needs further workup for ENT whether they decide to do further imaging or not.  I have placed a referral in the system but you should call on Monday to make an appoint for follow-up.    Also on your imaging you are found to have a pulmonary nodule.  This will also need follow-up imaging within the next 3 months.  Please call your primary care doctor to discuss setting up imaging.  If you are having difficulty getting in with ENT as well you can also discuss with your primary care doctor.    Return to ER if develop fever, shortness of breath, nausea or vomiting.

## 2024-12-08 LAB
ATRIAL RATE: 87 BPM
ATRIAL RATE: 88 BPM
P AXIS: 74 DEGREES
P AXIS: 79 DEGREES
PR INTERVAL: 124 MS
PR INTERVAL: 134 MS
QRS AXIS: 84 DEGREES
QRS AXIS: 95 DEGREES
QRSD INTERVAL: 76 MS
QRSD INTERVAL: 82 MS
QT INTERVAL: 372 MS
QT INTERVAL: 386 MS
QTC INTERVAL: 450 MS
QTC INTERVAL: 464 MS
T WAVE AXIS: 48 DEGREES
T WAVE AXIS: 49 DEGREES
VENTRICULAR RATE: 87 BPM
VENTRICULAR RATE: 88 BPM

## 2024-12-08 PROCEDURE — 93010 ELECTROCARDIOGRAM REPORT: CPT | Performed by: INTERNAL MEDICINE

## 2024-12-08 NOTE — ASSESSMENT & PLAN NOTE
Patient is smoking about half a pack per day discussed with patient at length regarding cessation of smoking and the consequences of continued smoking she wants to make a sincere effort now to quit she wants to try the NicoDerm patches which have prescribed.

## 2024-12-08 NOTE — ASSESSMENT & PLAN NOTE
Patient with history of chronic obstructive pulmonary disease currently not taking any inhalers expiration slightly prolonged no wheezing noted recommend very strongly to quit smoking various options discussed NicoDerm patches have been added to help with cessation of smoking

## 2024-12-08 NOTE — ASSESSMENT & PLAN NOTE
Patient with bilateral earache received amoxicillin clavulanic acid to which she is responding really well less discomfort feeling we will continue to finish the antibiotics.

## 2024-12-08 NOTE — ASSESSMENT & PLAN NOTE
Patient with a history of chronic low back pain without sciatica was in the past on Lidoderm patch and also muscle relaxer appears to have helped.  Exam is unremarkable movements of the lumbar spine causing some discomfort we will renew the medication Lidoderm patch for the back pain and will also prescribe baclofen 10 mg 3 times a day as needed.

## 2024-12-09 ENCOUNTER — TELEPHONE (OUTPATIENT)
Age: 59
End: 2024-12-09

## 2024-12-09 NOTE — TELEPHONE ENCOUNTER
PA lidocaine (Lidoderm) 5 % SUBMITTED     to MEDICARE     via    []CMM-KEY:    [x]Surescripts   []Availity-Auth ID #  NDC #    []Faxed to plan   []Other website    []Phone call Case ID #      []PA sent as URGENT    All office notes, labs and other pertaining documents and studies sent. Clinical questions answered. Awaiting determination from insurance company.     Turnaround time for your insurance to make a decision on your Prior Authorization can take 7-21 business days.

## 2024-12-10 NOTE — TELEPHONE ENCOUNTER
[place] lidocaine (Lidoderm) 5 % APPROVED         Patient advised by          []MyChart Message  [x]Phone call - x3 times, unable to connect  []LMOM  []L/M to call office as no active Communication consent on file  []Unable to leave detailed message as VM not approved on Communication consent       Pharmacy advised by    [x]Fax  []Phone call